# Patient Record
Sex: MALE | Race: WHITE | NOT HISPANIC OR LATINO | Employment: OTHER | URBAN - METROPOLITAN AREA
[De-identification: names, ages, dates, MRNs, and addresses within clinical notes are randomized per-mention and may not be internally consistent; named-entity substitution may affect disease eponyms.]

---

## 2017-08-29 DIAGNOSIS — F41.9 ANXIETY DISORDER, UNSPECIFIED TYPE: ICD-10-CM

## 2017-08-29 DIAGNOSIS — R73.09 ELEVATED HEMOGLOBIN A1C: ICD-10-CM

## 2017-08-29 DIAGNOSIS — Z00.00 ANNUAL PHYSICAL EXAM: ICD-10-CM

## 2017-08-29 DIAGNOSIS — E78.5 HYPERLIPIDEMIA, UNSPECIFIED HYPERLIPIDEMIA TYPE: Primary | ICD-10-CM

## 2017-08-29 LAB
CHOLEST SERPL-MCNC: 149 MG/DL (ref 0–200)
HBA1C MFR BLD: 5.79 % (ref 4.8–5.6)
HDLC SERPL-MCNC: 41 MG/DL (ref 40–60)
LDLC SERPL CALC-MCNC: 83 MG/DL (ref 0–100)
TRIGL SERPL-MCNC: 126 MG/DL (ref 0–150)
VLDLC SERPL CALC-MCNC: 25.2 MG/DL (ref 5–40)

## 2017-08-30 LAB — MICROALBUMIN UR-MCNC: 4.9 UG/ML

## 2017-09-05 ENCOUNTER — OFFICE VISIT (OUTPATIENT)
Dept: SPORTS MEDICINE | Facility: CLINIC | Age: 72
End: 2017-09-05

## 2017-09-05 VITALS
SYSTOLIC BLOOD PRESSURE: 116 MMHG | WEIGHT: 174 LBS | BODY MASS INDEX: 26.37 KG/M2 | HEIGHT: 68 IN | OXYGEN SATURATION: 98 % | DIASTOLIC BLOOD PRESSURE: 64 MMHG | HEART RATE: 64 BPM

## 2017-09-05 DIAGNOSIS — E78.2 MIXED HYPERLIPIDEMIA: ICD-10-CM

## 2017-09-05 DIAGNOSIS — Z12.5 SCREENING FOR PROSTATE CANCER: ICD-10-CM

## 2017-09-05 DIAGNOSIS — R73.03 PREDIABETES: Primary | ICD-10-CM

## 2017-09-05 DIAGNOSIS — R39.12 POOR URINARY STREAM: ICD-10-CM

## 2017-09-05 DIAGNOSIS — M54.41 CHRONIC RIGHT-SIDED LOW BACK PAIN WITH RIGHT-SIDED SCIATICA: ICD-10-CM

## 2017-09-05 DIAGNOSIS — G89.29 CHRONIC RIGHT-SIDED LOW BACK PAIN WITH RIGHT-SIDED SCIATICA: ICD-10-CM

## 2017-09-05 DIAGNOSIS — Z00.00 MEDICARE ANNUAL WELLNESS VISIT, SUBSEQUENT: ICD-10-CM

## 2017-09-05 PROCEDURE — 72100 X-RAY EXAM L-S SPINE 2/3 VWS: CPT | Performed by: FAMILY MEDICINE

## 2017-09-05 PROCEDURE — G0439 PPPS, SUBSEQ VISIT: HCPCS | Performed by: FAMILY MEDICINE

## 2017-09-05 PROCEDURE — 99214 OFFICE O/P EST MOD 30 MIN: CPT | Performed by: FAMILY MEDICINE

## 2017-09-05 RX ORDER — SIMVASTATIN 10 MG
10 TABLET ORAL DAILY
Qty: 90 TABLET | Refills: 3 | Status: SHIPPED | OUTPATIENT
Start: 2017-09-05 | End: 2018-08-29 | Stop reason: SDUPTHER

## 2017-09-05 NOTE — PROGRESS NOTES
QUICK REFERENCE INFORMATION:  The ABCs of the Annual Wellness Visit    Subsequent Medicare Wellness Visit    HEALTH RISK ASSESSMENT    1945    Recent Hospitalizations:  No hospitalization(s) within the last year..        Current Medical Providers:  Patient Care Team:  David Franks MD as PCP - General (Sports Medicine)  Rocío Carrillo MD as PCP - Claims Attributed        Smoking Status:  History   Smoking Status   • Never Smoker   Smokeless Tobacco   • Not on file       Alcohol Consumption:  History   Alcohol Use   • Yes       Depression Screen:   PHQ-2/PHQ-9 Depression Screening 9/5/2017   Little interest or pleasure in doing things 0   Feeling down, depressed, or hopeless 0   Trouble falling or staying asleep, or sleeping too much 0   Feeling tired or having little energy 1   Poor appetite or overeating 0   Feeling bad about yourself - or that you are a failure or have let yourself or your family down 0   Trouble concentrating on things, such as reading the newspaper or watching television 0   Moving or speaking so slowly that other people could have noticed. Or the opposite - being so fidgety or restless that you have been moving around a lot more than usual 0   Total Score 1   If you checked off any problems, how difficult have these problems made it for you to do your work, take care of things at home, or get along with other people? Not difficult at all       Health Habits and Functional and Cognitive Screening:  No flowsheet data found.           Does the patient have evidence of cognitive impairment? No    Aspirin use counseling: Does not need ASA (and currently is not on it)      Recent Lab Results:  CMP:  Lab Results   Component Value Date    GLU 94 09/05/2017    BUN 11 09/05/2017    CREATININE 1.05 09/05/2017    EGFRIFNONA 70 09/05/2017    EGFRIFAFRI 84 09/05/2017    BCR 10.5 09/05/2017     09/05/2017    K 4.6 09/05/2017    CO2 31.6 (H) 09/05/2017    CALCIUM 10.2 09/05/2017     PROTENTOTREF 8.0 09/05/2017    ALBUMIN 4.90 09/05/2017    LABGLOBREF 3.1 09/05/2017    LABIL2 1.6 09/05/2017    BILITOT 1.3 (H) 09/05/2017    ALKPHOS 74 09/05/2017    AST 23 09/05/2017    ALT 23 09/05/2017     Lipid Panel:  Lab Results   Component Value Date    TRIG 126 08/29/2017    HDL 41 08/29/2017    VLDL 25.2 08/29/2017     HbA1c:  Lab Results   Component Value Date    HGBA1C 5.79 (H) 08/29/2017       Visual Acuity:  No exam data present    Age-appropriate Screening Schedule:  Refer to the list below for future screening recommendations based on patient's age, sex and/or medical conditions. Orders for these recommended tests are listed in the plan section. The patient has been provided with a written plan.    Health Maintenance   Topic Date Due   • INFLUENZA VACCINE  09/01/2018 (Originally 8/1/2017)   • LIPID PANEL  08/29/2018   • COLONOSCOPY  10/03/2018   • TDAP/TD VACCINES (2 - Td) 01/01/2020   • PNEUMOCOCCAL VACCINES (65+ LOW/MEDIUM RISK)  Completed   • ZOSTER VACCINE  Addressed        Subjective   History of Present Illness    Chepe Arias is a 72 y.o. male who presents for an Subsequent Wellness Visit.    The following portions of the patient's history were reviewed and updated as appropriate: allergies, current medications, past family history, past medical history, past social history, past surgical history and problem list.    Outpatient Medications Prior to Visit   Medication Sig Dispense Refill   • ALPRAZolam (XANAX) 0.5 MG tablet Take  by mouth.     • Cholecalciferol (VITAMIN D) 2000 UNITS capsule Take  by mouth.     • famotidine (PEPCID) 20 MG tablet Take  by mouth.     • FLUoxetine (PROzac) 20 MG capsule      • Magnesium 400 MG capsule Take  by mouth.     • Multiple Minerals-Vitamins (CALCIUM CITRATE +) tablet Take  by mouth.     • MULTIPLE VITAMIN PO Take  by mouth.     • Omega-3 Fatty Acids (FISH OIL) 1000 MG capsule capsule Take  by mouth.     • FLUoxetine (PROZAC) 20 MG capsule Take  by  "mouth daily.     • simvastatin (ZOCOR) 10 MG tablet Take 1 tablet by mouth daily. 90 tablet 3     No facility-administered medications prior to visit.        Patient Active Problem List   Diagnosis   • Hyperlipidemia   • Low back pain   • Anxiety disorder   • Annual physical exam       Advance Care Planning:  power of  for healthcare on file    Identification of Risk Factors:  Risk factors include: cardiovascular risk and hearing limitations.    Review of Systems    Compared to one year ago, the patient feels his physical health is the same.  Compared to one year ago, the patient feels his mental health is the same.    Objective     Physical Exam    Vitals:    09/05/17 1142   BP: 116/64   Pulse: 64   SpO2: 98%   Weight: 174 lb (78.9 kg)   Height: 68\" (172.7 cm)       Body mass index is 26.46 kg/(m^2).  Discussed the patient's BMI with him. The BMI is above average; BMI management plan is completed.    Assessment/Plan   Patient Self-Management and Personalized Health Advice  The patient has been provided with information about: diet, exercise and weight management and preventive services including:   · Counseling for cardiovascular disease risk reduction, Exercise counseling provided, Fall Risk assessment done, Prostate cancer screening discussed.    Visit Diagnoses:    ICD-10-CM ICD-9-CM   1. Prediabetes R73.03 790.29   2. Mixed hyperlipidemia E78.2 272.2   3. Chronic right-sided low back pain with right-sided sciatica M54.41 724.2    G89.29 724.3     338.29   4. Medicare annual wellness visit, subsequent Z00.00 V70.0   5. Screening for prostate cancer Z12.5 V76.44   6. Poor urinary stream  R39.12 788.62       Orders Placed This Encounter   Procedures   • XR Spine Lumbar 2 or 3 View     Order Specific Question:   Reason for Exam:     Answer:   pain   • Comprehensive Metabolic Panel     Order Specific Question:   [facility] Has the patient fasted?     Answer:   No   • Hepatitis C Antibody     Order Specific " Question:   [facility] Has the patient fasted?     Answer:   No   • PSA     Order Specific Question:   [facility] Has the patient fasted?     Answer:   No   • Thyroid Cascade Profile     Order Specific Question:   [facility] Has the patient fasted?     Answer:   No   • CBC & Differential     Order Specific Question:   Manual Differential     Answer:   No     Order Specific Question:   [facility] Has the patient fasted?     Answer:   No       Outpatient Encounter Prescriptions as of 9/5/2017   Medication Sig Dispense Refill   • ALPRAZolam (XANAX) 0.5 MG tablet Take  by mouth.     • Cholecalciferol (VITAMIN D) 2000 UNITS capsule Take  by mouth.     • famotidine (PEPCID) 20 MG tablet Take  by mouth.     • FLUoxetine (PROzac) 20 MG capsule      • Magnesium 400 MG capsule Take  by mouth.     • Multiple Minerals-Vitamins (CALCIUM CITRATE +) tablet Take  by mouth.     • MULTIPLE VITAMIN PO Take  by mouth.     • Omega-3 Fatty Acids (FISH OIL) 1000 MG capsule capsule Take  by mouth.     • simvastatin (ZOCOR) 10 MG tablet Take 1 tablet by mouth Daily. 90 tablet 3   • [DISCONTINUED] FLUoxetine (PROZAC) 20 MG capsule Take  by mouth daily.     • [DISCONTINUED] simvastatin (ZOCOR) 10 MG tablet Take 1 tablet by mouth daily. 90 tablet 3     No facility-administered encounter medications on file as of 9/5/2017.        Reviewed use of high risk medication in the elderly: yes  Reviewed for potential of harmful drug interactions in the elderly: yes    Follow Up:  Return in about 6 months (around 3/5/2018) for Recheck.     An After Visit Summary and PPPS with all of these plans were given to the patient.

## 2017-09-05 NOTE — PROGRESS NOTES
Chepe is a 72 y.o. year old male    Chief Complaint   Patient presents with   • Diabetes   • Back Pain     Pt would like to discuss   *In addition to AWV    History of Present Illness   (pre-diabetes)  Diabetes   He presents for his follow-up diabetic visit. Diabetes type: pre-diabetes. His disease course has been stable. Pertinent negatives for hypoglycemia include no headaches. Associated symptoms include weakness (generalized, both legs). Pertinent negatives for diabetes include no chest pain and no weight loss.   Back Pain   This is a recurrent problem. The current episode started more than 1 year ago. The problem occurs intermittently. The problem has been gradually worsening since onset. The pain is present in the sacro-iliac. The quality of the pain is described as shooting. The pain radiates to the right thigh. The pain is at a severity of 7/10. The pain is worse during the day. The symptoms are aggravated by bending, position, standing and twisting. Stiffness is present all day. Associated symptoms include leg pain, numbness, paresthesias, pelvic pain, tingling and weakness (generalized, both legs). Pertinent negatives include no abdominal pain, bladder incontinence, bowel incontinence, chest pain, dysuria, fever, headaches, paresis, perianal numbness or weight loss. Risk factors include history of osteoporosis and obesity.       HLD stable with meds, no sx or SE    I have reviewed the patient's medical, family, and social history in detail and updated the computerized patient record.    Review of Systems   Constitutional: Negative for fever and weight loss.   Cardiovascular: Negative for chest pain.   Gastrointestinal: Negative for abdominal pain and bowel incontinence.   Genitourinary: Positive for pelvic pain. Negative for bladder incontinence and dysuria.   Musculoskeletal: Positive for back pain.   Neurological: Positive for tingling, weakness (generalized, both legs), numbness and paresthesias.  "Negative for headaches.       /64  Pulse 64  Ht 68\" (172.7 cm)  Wt 174 lb (78.9 kg)  SpO2 98%  BMI 26.46 kg/m2     Physical Exam   Constitutional: He is oriented to person, place, and time. He appears well-developed and well-nourished.   HENT:   Head: Normocephalic and atraumatic.   Eyes: Conjunctivae are normal. Pupils are equal, round, and reactive to light.   Neck: Normal range of motion.   Cardiovascular: Normal rate, regular rhythm and normal heart sounds.    Pulmonary/Chest: Effort normal and breath sounds normal.   Musculoskeletal:   L-spine - normal appearance. TTP mildlnie l2-l4. Restricted ROM. Neg slr   Neurological: He is alert and oriented to person, place, and time.   Skin: Skin is warm and dry.   Psychiatric: He has a normal mood and affect. Judgment normal.   Nursing note and vitals reviewed.    Lumbar Spine X-Ray  Indication: Pain  Views: AP and Lateral    Findings:  No fracture  No bony lesion  Normal soft tissues  DJD multiple levels, s/p fusion and kyphplasty    No prior studies were available for comparison.     Diagnoses and all orders for this visit:    Prediabetes  -     CBC & Differential  -     Comprehensive Metabolic Panel  -     Hepatitis C Antibody  -     PSA  -     Thyroid Cascade Profile    Mixed hyperlipidemia  -     CBC & Differential  -     Comprehensive Metabolic Panel  -     Hepatitis C Antibody  -     PSA  -     Thyroid Cascade Profile    Chronic right-sided low back pain with right-sided sciatica  -     XR Spine Lumbar 2 or 3 View    Medicare annual wellness visit, subsequent  -     CBC & Differential  -     Comprehensive Metabolic Panel  -     Hepatitis C Antibody  -     PSA  -     Thyroid Cascade Profile    Screening for prostate cancer  -     CBC & Differential  -     Comprehensive Metabolic Panel  -     Hepatitis C Antibody  -     PSA  -     Thyroid Cascade Profile    Poor urinary stream   -     PSA    Other orders  -     simvastatin (ZOCOR) 10 MG tablet; Take 1 " tablet by mouth Daily.    Continue current meds/tx. Keep optimizing diet/exercise for pre-dm.   Hep for chronic lbp

## 2017-09-06 LAB
ALBUMIN SERPL-MCNC: 4.9 G/DL (ref 3.5–5.2)
ALBUMIN/GLOB SERPL: 1.6 G/DL
ALP SERPL-CCNC: 74 U/L (ref 39–117)
ALT SERPL-CCNC: 23 U/L (ref 1–41)
AST SERPL-CCNC: 23 U/L (ref 1–40)
BASOPHILS # BLD AUTO: 0.08 10*3/MM3 (ref 0–0.2)
BASOPHILS NFR BLD AUTO: 1 % (ref 0–1.5)
BILIRUB SERPL-MCNC: 1.3 MG/DL (ref 0.1–1.2)
BUN SERPL-MCNC: 11 MG/DL (ref 8–23)
BUN/CREAT SERPL: 10.5 (ref 7–25)
CALCIUM SERPL-MCNC: 10.2 MG/DL (ref 8.6–10.5)
CHLORIDE SERPL-SCNC: 98 MMOL/L (ref 98–107)
CO2 SERPL-SCNC: 31.6 MMOL/L (ref 22–29)
CREAT SERPL-MCNC: 1.05 MG/DL (ref 0.76–1.27)
EOSINOPHIL # BLD AUTO: 0.32 10*3/MM3 (ref 0–0.7)
EOSINOPHIL NFR BLD AUTO: 4 % (ref 0.3–6.2)
ERYTHROCYTE [DISTWIDTH] IN BLOOD BY AUTOMATED COUNT: 12.3 % (ref 11.5–14.5)
GLOBULIN SER CALC-MCNC: 3.1 GM/DL
GLUCOSE SERPL-MCNC: 94 MG/DL (ref 65–99)
HCT VFR BLD AUTO: 47 % (ref 40.4–52.2)
HCV AB S/CO SERPL IA: <0.1 S/CO RATIO (ref 0–0.9)
HGB BLD-MCNC: 15 G/DL (ref 13.7–17.6)
IMM GRANULOCYTES # BLD: 0 10*3/MM3 (ref 0–0.03)
IMM GRANULOCYTES NFR BLD: 0 % (ref 0–0.5)
LYMPHOCYTES # BLD AUTO: 1.78 10*3/MM3 (ref 0.9–4.8)
LYMPHOCYTES NFR BLD AUTO: 22 % (ref 19.6–45.3)
MCH RBC QN AUTO: 31.3 PG (ref 27–32.7)
MCHC RBC AUTO-ENTMCNC: 31.9 G/DL (ref 32.6–36.4)
MCV RBC AUTO: 98.1 FL (ref 79.8–96.2)
MONOCYTES # BLD AUTO: 0.49 10*3/MM3 (ref 0.2–1.2)
MONOCYTES NFR BLD AUTO: 6 % (ref 5–12)
NEUTROPHILS # BLD AUTO: 5.43 10*3/MM3 (ref 1.9–8.1)
NEUTROPHILS NFR BLD AUTO: 67 % (ref 42.7–76)
PLATELET # BLD AUTO: 242 10*3/MM3 (ref 140–500)
POTASSIUM SERPL-SCNC: 4.6 MMOL/L (ref 3.5–5.2)
PROT SERPL-MCNC: 8 G/DL (ref 6–8.5)
PSA SERPL-MCNC: 1.17 NG/ML (ref 0–4)
RBC # BLD AUTO: 4.79 10*6/MM3 (ref 4.6–6)
SODIUM SERPL-SCNC: 142 MMOL/L (ref 136–145)
TSH SERPL DL<=0.005 MIU/L-ACNC: 1.22 UIU/ML (ref 0.45–4.5)
WBC # BLD AUTO: 8.1 10*3/MM3 (ref 4.5–10.7)

## 2017-09-08 ENCOUNTER — TELEPHONE (OUTPATIENT)
Dept: SPORTS MEDICINE | Facility: CLINIC | Age: 72
End: 2017-09-08

## 2017-09-08 NOTE — TELEPHONE ENCOUNTER
----- Message from David Franks MD sent at 9/8/2017 12:57 AM EDT -----  Your other labs are all ok.

## 2018-08-29 RX ORDER — SIMVASTATIN 10 MG
TABLET ORAL
Qty: 30 TABLET | Refills: 0 | Status: SHIPPED | OUTPATIENT
Start: 2018-08-29 | End: 2018-09-12 | Stop reason: SDUPTHER

## 2018-09-04 DIAGNOSIS — E78.2 MIXED HYPERLIPIDEMIA: ICD-10-CM

## 2018-09-04 DIAGNOSIS — Z00.00 ANNUAL PHYSICAL EXAM: Primary | ICD-10-CM

## 2018-09-04 DIAGNOSIS — Z12.5 SCREENING FOR PROSTATE CANCER: ICD-10-CM

## 2018-09-04 DIAGNOSIS — R39.12 POOR URINARY STREAM: ICD-10-CM

## 2018-09-04 DIAGNOSIS — R73.03 PREDIABETES: ICD-10-CM

## 2018-09-06 LAB
ALBUMIN SERPL-MCNC: 4.7 G/DL (ref 3.5–5.2)
ALBUMIN/GLOB SERPL: 2 G/DL
ALP SERPL-CCNC: 61 U/L (ref 39–117)
ALT SERPL-CCNC: 21 U/L (ref 1–41)
AST SERPL-CCNC: 25 U/L (ref 1–40)
BASOPHILS # BLD AUTO: 0.08 10*3/MM3 (ref 0–0.2)
BASOPHILS NFR BLD AUTO: 1.2 % (ref 0–1.5)
BILIRUB SERPL-MCNC: 1.5 MG/DL (ref 0.1–1.2)
BUN SERPL-MCNC: 12 MG/DL (ref 8–23)
BUN/CREAT SERPL: 11.1 (ref 7–25)
CALCIUM SERPL-MCNC: 9.4 MG/DL (ref 8.6–10.5)
CHLORIDE SERPL-SCNC: 101 MMOL/L (ref 98–107)
CHOLEST SERPL-MCNC: 164 MG/DL (ref 0–200)
CO2 SERPL-SCNC: 28.8 MMOL/L (ref 22–29)
CREAT SERPL-MCNC: 1.08 MG/DL (ref 0.76–1.27)
DIFFERENTIAL COMMENT: NORMAL
EOSINOPHIL # BLD AUTO: 0.46 10*3/MM3 (ref 0–0.7)
EOSINOPHIL NFR BLD AUTO: 6.9 % (ref 0.3–6.2)
ERYTHROCYTE [DISTWIDTH] IN BLOOD BY AUTOMATED COUNT: 12.4 % (ref 11.5–14.5)
GLOBULIN SER CALC-MCNC: 2.4 GM/DL
GLUCOSE SERPL-MCNC: 105 MG/DL (ref 65–99)
HBA1C MFR BLD: 5.7 % (ref 4.8–5.6)
HCT VFR BLD AUTO: 46 % (ref 40.4–52.2)
HDLC SERPL-MCNC: 45 MG/DL (ref 40–60)
HGB BLD-MCNC: 14.7 G/DL (ref 13.7–17.6)
IMM GRANULOCYTES # BLD: 0 10*3/MM3 (ref 0–0.03)
IMM GRANULOCYTES NFR BLD: 0 % (ref 0–0.5)
LDLC SERPL CALC-MCNC: 93 MG/DL (ref 0–100)
LYMPHOCYTES # BLD AUTO: 1.85 10*3/MM3 (ref 0.9–4.8)
LYMPHOCYTES NFR BLD AUTO: 27.7 % (ref 19.6–45.3)
Lab: NORMAL
MCH RBC QN AUTO: 31 PG (ref 27–32.7)
MCHC RBC AUTO-ENTMCNC: 32 G/DL (ref 32.6–36.4)
MCV RBC AUTO: 97 FL (ref 79.8–96.2)
MONOCYTES # BLD AUTO: 0.31 10*3/MM3 (ref 0.2–1.2)
MONOCYTES NFR BLD AUTO: 4.6 % (ref 5–12)
NEUTROPHILS # BLD AUTO: 3.97 10*3/MM3 (ref 1.9–8.1)
NEUTROPHILS NFR BLD AUTO: 59.6 % (ref 42.7–76)
PLATELET # BLD AUTO: 191 10*3/MM3 (ref 140–500)
PLATELET BLD QL SMEAR: NORMAL
POTASSIUM SERPL-SCNC: 4.5 MMOL/L (ref 3.5–5.2)
PROT SERPL-MCNC: 7.1 G/DL (ref 6–8.5)
PSA SERPL-MCNC: 1.42 NG/ML (ref 0–4)
RBC # BLD AUTO: 4.74 10*6/MM3 (ref 4.6–6)
RBC MORPH BLD: NORMAL
SODIUM SERPL-SCNC: 141 MMOL/L (ref 136–145)
TRIGL SERPL-MCNC: 130 MG/DL (ref 0–150)
TSH SERPL DL<=0.005 MIU/L-ACNC: 2.05 UIU/ML (ref 0.45–4.5)
VLDLC SERPL CALC-MCNC: 26 MG/DL (ref 5–40)
WBC # BLD AUTO: 6.67 10*3/MM3 (ref 4.5–10.7)
WRITTEN AUTHORIZATION: NORMAL

## 2018-09-12 ENCOUNTER — OFFICE VISIT (OUTPATIENT)
Dept: SPORTS MEDICINE | Facility: CLINIC | Age: 73
End: 2018-09-12

## 2018-09-12 VITALS
HEART RATE: 54 BPM | HEIGHT: 68 IN | WEIGHT: 177 LBS | SYSTOLIC BLOOD PRESSURE: 118 MMHG | OXYGEN SATURATION: 96 % | BODY MASS INDEX: 26.83 KG/M2 | DIASTOLIC BLOOD PRESSURE: 70 MMHG | TEMPERATURE: 98.1 F

## 2018-09-12 DIAGNOSIS — Z00.00 MEDICARE ANNUAL WELLNESS VISIT, SUBSEQUENT: Primary | ICD-10-CM

## 2018-09-12 DIAGNOSIS — E78.2 MIXED HYPERLIPIDEMIA: ICD-10-CM

## 2018-09-12 DIAGNOSIS — M54.2 NECK PAIN: ICD-10-CM

## 2018-09-12 DIAGNOSIS — F41.9 ANXIETY DISORDER, UNSPECIFIED TYPE: ICD-10-CM

## 2018-09-12 DIAGNOSIS — M51.36 LUMBAR DEGENERATIVE DISC DISEASE: ICD-10-CM

## 2018-09-12 DIAGNOSIS — R73.03 PREDIABETES: ICD-10-CM

## 2018-09-12 PROCEDURE — 90471 IMMUNIZATION ADMIN: CPT | Performed by: FAMILY MEDICINE

## 2018-09-12 PROCEDURE — G0439 PPPS, SUBSEQ VISIT: HCPCS | Performed by: FAMILY MEDICINE

## 2018-09-12 PROCEDURE — 99214 OFFICE O/P EST MOD 30 MIN: CPT | Performed by: FAMILY MEDICINE

## 2018-09-12 PROCEDURE — 90632 HEPA VACCINE ADULT IM: CPT | Performed by: FAMILY MEDICINE

## 2018-09-12 RX ORDER — SIMVASTATIN 10 MG
10 TABLET ORAL DAILY
Qty: 90 TABLET | Refills: 3 | Status: SHIPPED | OUTPATIENT
Start: 2018-09-12 | End: 2018-10-17 | Stop reason: SDUPTHER

## 2018-09-12 NOTE — PROGRESS NOTES
QUICK REFERENCE INFORMATION:  The ABCs of the Annual Wellness Visit    Subsequent Medicare Wellness Visit    HEALTH RISK ASSESSMENT    1945    Recent Hospitalizations:  No hospitalization(s) within the last year..        Current Medical Providers:  Patient Care Team:  David Franks MD as PCP - General (Sports Medicine)  Rocío Carrillo MD as PCP - Claims Attributed        Smoking Status:  History   Smoking Status   • Never Smoker   Smokeless Tobacco   • Not on file       Alcohol Consumption:  History   Alcohol Use   • Yes       Depression Screen:   PHQ-2/PHQ-9 Depression Screening 9/12/2018   Little interest or pleasure in doing things 0   Feeling down, depressed, or hopeless 1   Trouble falling or staying asleep, or sleeping too much -   Feeling tired or having little energy -   Poor appetite or overeating -   Feeling bad about yourself - or that you are a failure or have let yourself or your family down -   Trouble concentrating on things, such as reading the newspaper or watching television -   Moving or speaking so slowly that other people could have noticed. Or the opposite - being so fidgety or restless that you have been moving around a lot more than usual -   Total Score 1   If you checked off any problems, how difficult have these problems made it for you to do your work, take care of things at home, or get along with other people? -       Health Habits and Functional and Cognitive Screening:  Functional & Cognitive Status 9/12/2018   Do you have difficulty preparing food and eating? No   Do you have difficulty bathing yourself, getting dressed or grooming yourself? No   Do you have difficulty using the toilet? No   Do you have difficulty moving around from place to place? No   Do you have trouble with steps or getting out of a bed or a chair? No   In the past year have you fallen or experienced a near fall? No   Current Diet Frequent Junk Food   Dental Exam Up to date   Eye Exam Not up to  date   Exercise (times per week) 3 times per week   Current Exercise Activities Include Stationary Bicycling/Spin Class   Do you need help using the phone?  Yes   Are you deaf or do you have serious difficulty hearing?  Yes   Do you need help with transportation? No   Do you need help shopping? No   Do you need help preparing meals?  No   Do you need help with housework?  No   Do you need help with laundry? No   Do you need help taking your medications? No   Do you need help managing money? No   Do you ever drive or ride in a car without wearing a seat belt? No           Does the patient have evidence of cognitive impairment? No    Aspirin use counseling: Start ASA 81 mg daily       Recent Lab Results:  CMP:  Lab Results   Component Value Date     (H) 09/05/2018    BUN 12 09/05/2018    CREATININE 1.08 09/05/2018    EGFRIFNONA 67 09/05/2018    EGFRIFAFRI 81 09/05/2018    BCR 11.1 09/05/2018     09/05/2018    K 4.5 09/05/2018    CO2 28.8 09/05/2018    CALCIUM 9.4 09/05/2018    PROTENTOTREF 7.1 09/05/2018    ALBUMIN 4.70 09/05/2018    LABGLOBREF 2.4 09/05/2018    LABIL2 2.0 09/05/2018    BILITOT 1.5 (H) 09/05/2018    ALKPHOS 61 09/05/2018    AST 25 09/05/2018    ALT 21 09/05/2018     Lipid Panel:  Lab Results   Component Value Date    TRIG 130 09/05/2018    HDL 45 09/05/2018    VLDL 26 09/05/2018     HbA1c:  Lab Results   Component Value Date    HGBA1C 5.70 (H) 09/05/2018       Visual Acuity:  No exam data present    Age-appropriate Screening Schedule:  Refer to the list below for future screening recommendations based on patient's age, sex and/or medical conditions. Orders for these recommended tests are listed in the plan section. The patient has been provided with a written plan.    Health Maintenance   Topic Date Due   • ZOSTER VACCINE (2 of 2) 09/08/2016   • INFLUENZA VACCINE  08/01/2018   • COLONOSCOPY  10/03/2018   • LIPID PANEL  09/05/2019   • TDAP/TD VACCINES (2 - Td) 01/01/2020   • PNEUMOCOCCAL  "VACCINES (65+ LOW/MEDIUM RISK)  Completed        Subjective   History of Present Illness    Chepe Arias is a 73 y.o. male who presents for an Subsequent Wellness Visit.    The following portions of the patient's history were reviewed and updated as appropriate: allergies, current medications, past family history, past medical history, past social history, past surgical history and problem list.    Outpatient Medications Prior to Visit   Medication Sig Dispense Refill   • ALPRAZolam (XANAX) 0.5 MG tablet Take  by mouth.     • Cholecalciferol (VITAMIN D) 2000 UNITS capsule Take  by mouth.     • famotidine (PEPCID) 20 MG tablet Take  by mouth.     • FLUoxetine (PROzac) 20 MG capsule      • Magnesium 400 MG capsule Take  by mouth.     • Multiple Minerals-Vitamins (CALCIUM CITRATE +) tablet Take  by mouth.     • MULTIPLE VITAMIN PO Take  by mouth.     • Omega-3 Fatty Acids (FISH OIL) 1000 MG capsule capsule Take  by mouth.     • simvastatin (ZOCOR) 10 MG tablet TAKE 1 TABLET DAILY 30 tablet 0     No facility-administered medications prior to visit.        Patient Active Problem List   Diagnosis   • Hyperlipidemia   • Low back pain   • Prediabetes   • Anxiety disorder   • Annual physical exam       Advance Care Planning:  power of  for healthcare on file    Identification of Risk Factors:  Risk factors include: cardiovascular risk, depression, vision limitations and hearing limitations.    Review of Systems    Compared to one year ago, the patient feels his physical health is the same.  Compared to one year ago, the patient feels his mental health is better.    Objective     Physical Exam    Vitals:    09/12/18 0849   BP: 118/70   Pulse: 54   Temp: 98.1 °F (36.7 °C)   SpO2: 96%   Weight: 80.3 kg (177 lb)   Height: 172.7 cm (68\")       Patient's Body mass index is 26.91 kg/m². BMI is above normal parameters. Recommendations include: exercise counseling and nutrition counseling.      Assessment/Plan   Patient " Self-Management and Personalized Health Advice  The patient has been provided with information about: diet, exercise and prevention of cardiac or vascular disease and preventive services including:   · Counseling for cardiovascular disease risk reduction, Diabetes screening, see lab orders, Exercise counseling provided, Influenza vaccine, Prostate cancer screening discussed.    Visit Diagnoses:    ICD-10-CM ICD-9-CM   1. Medicare annual wellness visit, subsequent Z00.00 V70.0   2. Mixed hyperlipidemia E78.2 272.2   3. Neck pain M54.2 723.1   4. Lumbar degenerative disc disease M51.36 722.52   5. Prediabetes R73.03 790.29   6. Anxiety disorder, unspecified type F41.9 300.00       Orders Placed This Encounter   Procedures   • Hepatitis A Vaccine Adult (HAVRIX) - Today   • Hepatitis A Vaccine Adult  (HAVRIX) - Dose 2     Standing Status:   Future   • Ambulatory Referral to Physical Therapy Evaluate and treat     Referral Priority:   Routine     Referral Type:   Therapy     Referral Reason:   Specialty Services Required     Requested Specialty:   Physical Therapy     Number of Visits Requested:   1       Outpatient Encounter Prescriptions as of 9/12/2018   Medication Sig Dispense Refill   • ALPRAZolam (XANAX) 0.5 MG tablet Take  by mouth.     • Cholecalciferol (VITAMIN D) 2000 UNITS capsule Take  by mouth.     • famotidine (PEPCID) 20 MG tablet Take  by mouth.     • FLUoxetine (PROzac) 20 MG capsule      • Magnesium 400 MG capsule Take  by mouth.     • Multiple Minerals-Vitamins (CALCIUM CITRATE +) tablet Take  by mouth.     • MULTIPLE VITAMIN PO Take  by mouth.     • Omega-3 Fatty Acids (FISH OIL) 1000 MG capsule capsule Take  by mouth.     • simvastatin (ZOCOR) 10 MG tablet Take 1 tablet by mouth Daily. 90 tablet 3   • [DISCONTINUED] simvastatin (ZOCOR) 10 MG tablet TAKE 1 TABLET DAILY 30 tablet 0     No facility-administered encounter medications on file as of 9/12/2018.        Reviewed use of high risk medication in  the elderly: yes  Reviewed for potential of harmful drug interactions in the elderly: yes    Follow Up:  Return in about 6 months (around 3/12/2019), or Hep A #2.     An After Visit Summary and PPPS with all of these plans were given to the patient.

## 2018-09-12 NOTE — PROGRESS NOTES
"Chepe is a 73 y.o. year old male    Chief Complaint   Patient presents with   • Annual Exam   In addition to annual wellness visit here for complaints of neck and back pain, also f/u HLD and pre-dm.     History of Present Illness   Back Pain   This is a recurrent problem. The current episode started more than 1 year ago. The problem occurs every several days. The problem has been waxing and waning since onset. The pain is present in the lumbar spine. The quality of the pain is described as aching. The pain radiates to the right thigh. The pain is at a severity of 2/10. The pain is worse during the day. The symptoms are aggravated by bending. Stiffness is present in the morning. Associated symptoms include numbness (occ RLE). Pertinent negatives include no headaches.      New complaint of neck pain. Worse with movement, mild, constant, 4 months. NKI, gradual onset.   Chronic back pain \"about the same\". Stiff, occ rad to R leg.     Wants hep A vac    Recheck HLD, stable with statin.  Prediabetes doing well, continues caution with sugar intake.     I have reviewed the patient's medical, family, and social history in detail and updated the computerized patient record.    Review of Systems   Constitutional: Negative.    HENT: Positive for hearing loss (stable with cochlear implant on L and hearing aid on R).    Eyes: Positive for visual disturbance (worsening vision this year, having it checked).   Respiratory: Negative.    Cardiovascular: Negative.    Gastrointestinal: Negative.    Genitourinary: Negative.    Musculoskeletal: Positive for back pain.   Neurological: Positive for numbness (occ RLE). Negative for headaches.   Psychiatric/Behavioral: The patient is nervous/anxious (stable with psych management).        /70   Pulse 54   Temp 98.1 °F (36.7 °C)   Ht 172.7 cm (68\")   Wt 80.3 kg (177 lb)   SpO2 96%   BMI 26.91 kg/m²      Physical Exam   Constitutional: He is oriented to person, place, and time. He " appears well-developed and well-nourished.   HENT:   Head: Normocephalic and atraumatic.   Mouth/Throat: Oropharynx is clear and moist.   R ear hearing aid; L side cochlear implant   Eyes: Pupils are equal, round, and reactive to light. Conjunctivae are normal.   Neck: Normal range of motion.   Cardiovascular: Normal rate, regular rhythm and normal heart sounds.    No LE edema   Pulmonary/Chest: Effort normal and breath sounds normal.   Musculoskeletal:   Cervical spine: Normal appearance.  Mild right-sided paraspinal tenderness to palpation.  Range of motion intact, but there is some subjective tightness with right-sided rotation and side bending.  Negative axial load   Neurological: He is alert and oriented to person, place, and time.   Skin: Skin is warm and dry.   Psychiatric: He has a normal mood and affect. Judgment normal.   Nursing note and vitals reviewed.       Diagnoses and all orders for this visit:    Medicare annual wellness visit, subsequent  -     Hepatitis A Vaccine Adult (HAVRIX) - Today  -     Hepatitis A Vaccine Adult  (HAVRIX) - Dose 2; Future    Mixed hyperlipidemia  -     simvastatin (ZOCOR) 10 MG tablet; Take 1 tablet by mouth Daily.    Neck pain  -     Ambulatory Referral to Physical Therapy Evaluate and treat    Lumbar degenerative disc disease  -     Ambulatory Referral to Physical Therapy Evaluate and treat    Prediabetes  -     simvastatin (ZOCOR) 10 MG tablet; Take 1 tablet by mouth Daily.    Anxiety disorder, unspecified type       Start in physical therapy for his neck and his back.  Continue Tylenol and heat for pain relief.  Continue follow-up with psychiatry for anxiety.  Continue simvastatin for hyperlipidemia.  Continue his diet and exercise for prediabetes, doing well with that.      EMR Dragon/Transcription disclaimer:    Much of this encounter note is an electronic transcription/translation of spoken language to printed text.  The electronic translation of spoken language may  permit erroneous, or at times, nonsensical words or phrases to be inadvertently transcribed.  Although I have reviewed the note for such errors some may still exist.

## 2018-09-21 ENCOUNTER — TREATMENT (OUTPATIENT)
Dept: PHYSICAL THERAPY | Facility: CLINIC | Age: 73
End: 2018-09-21

## 2018-09-21 DIAGNOSIS — M54.2 NECK PAIN: Primary | ICD-10-CM

## 2018-09-21 DIAGNOSIS — M51.36 DDD (DEGENERATIVE DISC DISEASE), LUMBAR: ICD-10-CM

## 2018-09-21 PROCEDURE — G8984 CARRY CURRENT STATUS: HCPCS | Performed by: PHYSICAL THERAPIST

## 2018-09-21 PROCEDURE — 97162 PT EVAL MOD COMPLEX 30 MIN: CPT | Performed by: PHYSICAL THERAPIST

## 2018-09-21 PROCEDURE — 97110 THERAPEUTIC EXERCISES: CPT | Performed by: PHYSICAL THERAPIST

## 2018-09-21 PROCEDURE — 97140 MANUAL THERAPY 1/> REGIONS: CPT | Performed by: PHYSICAL THERAPIST

## 2018-09-21 PROCEDURE — G8985 CARRY GOAL STATUS: HCPCS | Performed by: PHYSICAL THERAPIST

## 2018-09-21 NOTE — PATIENT INSTRUCTIONS
Access Code: [email or web address]   URL: [email or web address]   Date: 09/21/2018   Prepared by: Guerda Bhat     Exercises   Cervical Retraction at Wall - 10 reps - 1 sets - 5 sec. hold - 1x daily - 7x weekly   Shoulder Extension with Resistance - 10 reps - 1 sets - 5 sec. hold - 1x daily - 7x weekly   Standing Shoulder Row with Anchored Resistance - 10 reps - 1 sets - 5 sec. hold - 1x daily - 7x weekly   Seated Upper Trapezius Stretch - 4 reps - 1 sets - 15 sec. hold - 1x daily - 7x weekly

## 2018-09-21 NOTE — PROGRESS NOTES
Physical Therapy Initial Evaluation and Plan of Care      Patient: Chepe Arias   : 1945  Diagnosis/ICD-10 Code:  Neck pain [M54.2]  Referring practitioner: David Franks MD  Date of Initial Visit: 2018  Today's Date: 2018  Patient seen for 1 sessions           Subjective Questionnaire: NDI      Subjective Evaluation    History of Present Illness  Date of onset: 2018  Mechanism of injury: Pt has insidious onset of neck pain over the last several months.  This pain occurs with movements such as rotation and side bending.  This dull ache in his neck does not have any radiating symptoms down the arm, but usually occurs to the side he is looking towards. The right side is more painful than left.  This impairment has not limited him in any activities but is described as bothersome.      PMH: Fractured vertebrae in lower thoracic repaired with arnoldo, pre-DM, high cholesterol, anxiety,  Lumbar DDD, and cochlear ear implants    Subjective comment: Pt reports minor neck pain with movements.   Patient Occupation: retired    Precautions and Work Restrictions: noneQuality of life: good    Pain  Current pain ratin  At worst pain ratin  Location: lower cervical/ upper trap  Quality: dull ache  Relieving factors: change in position and medications  Aggravating factors: repetitive movement and prolonged positioning (Rotating/ SB head to the right)  Progression: no change    Social Support  Lives in: multiple-level home  Lives with: alone    Hand dominance: right    Diagnostic Tests  No diagnostic tests performed    Treatments  No previous or current treatments  Patient Goals  Patient goals for therapy: decreased pain, increased motion, return to sport/leisure activities, independence with ADLs/IADLs and increased strength  Patient goal: Education on HEP           Objective       Postural Observations    Additional Postural Observation Details  Forward head, rounded shoulders     Palpation    Left   Tenderness of the suboccipitals and upper trapezius.     Right Tenderness of the suboccipitals and upper trapezius.     Active Range of Motion   Cervical/Thoracic Spine   Cervical    Flexion: 60 degrees   Extension: 45 degrees   Left lateral flexion: 20 degrees with pain  Right lateral flexion: 16 degrees with pain  Left rotation: 38 degrees with pain  Right rotation: 48 degrees     Strength/Myotome Testing   Cervical Spine     Left   Normal strength    Right   Normal strength    Tests   Cervical     Left   Positive cervical distraction.   Negative Spurling's sign.     Right   Positive cervical distraction.   Negative Spurling's sign.     Additional Tests Details  Bilateral Upper trap/Levator Scap limitations          Assessment & Plan     Assessment  Impairments: abnormal muscle tone, abnormal or restricted ROM, activity intolerance, lacks appropriate home exercise program and pain with function  Assessment details: Chepe Arias is a pleasant 73 y.o. male that presents with signs and symptoms consistent with the above diagnosis. Pt would benefit from skilled PT services in order to address listed impairments and increase tolerance to normal daily activities including ADLs, IADLs,  and recreational activities.  Barriers to therapy: PMH  Prognosis: good  Prognosis details: Patient demonstrates good rehab potential as evidenced by high motivation to participate with PT POC/HEP and to return to PLOF/ADLs/IADLs/recreational activities  Functional Limitations: uncomfortable because of pain  Goals  Plan Goals: Plan Goals: Short Term Goals (2-3 wks):  1.  Patient will have increased cervical spine ROM to WFLs to allow for increased functional joint mobility.  2.  Patient will have increased cervical spine and thoracic spine segmental mobility WFLs to increase cervical ROM.  3.  Patient will have decreased pain to 0/10 to allow for increased comfort with functional activities including exercises.   4.  Patient  will have increased cervical spine muscle flexibility to WNLs.    Long Term Goals (4-6wks):  1.  Patient will be independent in performance of HEP for carryover upon discharge from skilled PT services.  2.  Patient will have increased NDI score of 2/50 or better.  3.  Patient will report return to performance of ADLs/Work/Sport/Leisure activities with minimal to no symptom reproduction/pain limitations.    Plan  Therapy options: will be seen for skilled physical therapy services  Planned modality interventions: cryotherapy, TENS, thermotherapy (hydrocollator packs), high voltage pulsed current (pain management), high voltage pulsed current (spasm management), traction and ultrasound  Planned therapy interventions: abdominal trunk stabilization, manual therapy, ADL retraining, body mechanics training, postural training, flexibility, soft tissue mobilization, spinal/joint mobilization, strengthening, stretching, functional ROM exercises, home exercise program, joint mobilization and therapeutic activities  Frequency: 2x week  Duration in visits: 12  Treatment plan discussed with: patient  Plan details: Pt was educated about HEP and importance of adherence to it.  Pt goals were established and plan was discussed with the patient.        Manual Therapy:    15     mins  38731;  Therapeutic Exercise:    8     mins  83857;     Neuromuscular Randi:        mins  66828;    Therapeutic Activity:          mins  06579;     Gait Training:           mins  68021;     Ultrasound:          mins  69607;    Electrical Stimulation:         mins  21649 ( );  Dry Needling          mins self-pay    Timed Treatment:   23   mins   Total Treatment:     45   mins    PT SIGNATURE: Guerda Bhat, PT   DATE TREATMENT INITIATED: 9/21/2018    Initial Certification  Certification Period: 12/20/2018  I certify that the therapy services are furnished while this patient is under my care.  The services outlined above are required by this patient,  and will be reviewed every 90 days.     PHYSICIAN: David Franks MD      DATE:     Please sign and return via fax to [phone].. Thank you, [facility].

## 2018-09-24 ENCOUNTER — TREATMENT (OUTPATIENT)
Dept: PHYSICAL THERAPY | Facility: CLINIC | Age: 73
End: 2018-09-24

## 2018-09-24 DIAGNOSIS — M54.2 NECK PAIN: Primary | ICD-10-CM

## 2018-09-24 DIAGNOSIS — M51.36 DDD (DEGENERATIVE DISC DISEASE), LUMBAR: ICD-10-CM

## 2018-09-24 PROCEDURE — 97110 THERAPEUTIC EXERCISES: CPT | Performed by: PHYSICAL THERAPIST

## 2018-09-24 PROCEDURE — 97140 MANUAL THERAPY 1/> REGIONS: CPT | Performed by: PHYSICAL THERAPIST

## 2018-09-24 NOTE — PROGRESS NOTES
Physical Therapy Daily Progress Note      Patient: Chepe Arias   : 1945  Diagnosis/ICD-10 Code:  Neck pain [M54.2]  Referring practitioner: David Franks MD  Date of Initial Visit: Type: THERAPY  Noted: 2018  Today's Date: 2018  Patient seen for 2 sessions         Chepe Arias reports:       Subjective   Patient reports his neck is fairly sore today but that is typical with rainy weather.    Objective   See Exercise, Manual, and Modality Logs for complete treatment.       Assessment/Plan  Patient tolerated treatment well with decreased cervical/upper thoracic region pain post treatment.  No adverse symptoms to exercise progression.  Progressed HEP.  Progress per Plan of Care           Manual Therapy:    13     mins  37157;  Therapeutic Exercise:    10     mins  38611;     Neuromuscular Randi:        mins  73576;    Therapeutic Activity:          mins  21262;     Gait Training:           mins  85262;     Ultrasound:          mins  48228;    Electrical Stimulation:         mins  92528 ( );  Dry Needling          mins self-pay    Timed Treatment:   23   mins   Total Treatment:     35   mins    Guerda Bhat, PT  Physical Therapist

## 2018-09-24 NOTE — PROGRESS NOTES
I have reviewed the notes, assessments, and/or procedures performed by Agustín Erwin Student PT, I concur with her/his documentation of Chepe Arias.

## 2018-09-27 ENCOUNTER — TREATMENT (OUTPATIENT)
Dept: PHYSICAL THERAPY | Facility: CLINIC | Age: 73
End: 2018-09-27

## 2018-09-27 DIAGNOSIS — M51.36 DDD (DEGENERATIVE DISC DISEASE), LUMBAR: ICD-10-CM

## 2018-09-27 DIAGNOSIS — M54.2 NECK PAIN: Primary | ICD-10-CM

## 2018-09-27 PROCEDURE — 97140 MANUAL THERAPY 1/> REGIONS: CPT | Performed by: PHYSICAL THERAPIST

## 2018-09-27 PROCEDURE — 97110 THERAPEUTIC EXERCISES: CPT | Performed by: PHYSICAL THERAPIST

## 2018-09-27 NOTE — PROGRESS NOTES
Physical Therapy Daily Progress Note      Patient: Chepe Arias   : 1945  Diagnosis/ICD-10 Code:  Neck pain [M54.2]  Referring practitioner: David Franks MD  Date of Initial Visit: Type: THERAPY  Noted: 2018  Today's Date: 2018  Patient seen for 3 sessions         Chepe Arias reports:    Subjective   Pt reported that he is performing his HEP at home and still feels tightness on the right side of his neck.     Objective   See Exercise, Manual, and Modality Logs for complete treatment.       Assessment/Plan  Pt is tolerating treatment and having reported decreased pain after therapy.  Pt has increased muscle spasm in right upper in middle trap which was relieved with STM.  Pt has limited SBing and rotation but benefited largely from manual therapy in rotation. Pt was educated about HEP and how to properly perform exercises.      Progress per Plan of Care           Manual Therapy:    18     mins  58653;  Therapeutic Exercise:    12     mins  88501;     Neuromuscular Randi:        mins  44127;    Therapeutic Activity:          mins  26914;     Gait Training:           mins  96713;     Ultrasound:          mins  94617;    Electrical Stimulation:         mins  15612 ( );  Dry Needling          mins self-pay    Timed Treatment:   30   mins   Total Treatment:     30   mins    Guerda Bhat, PT  Physical Therapist

## 2018-10-02 ENCOUNTER — TREATMENT (OUTPATIENT)
Dept: PHYSICAL THERAPY | Facility: CLINIC | Age: 73
End: 2018-10-02

## 2018-10-02 DIAGNOSIS — M54.2 NECK PAIN: Primary | ICD-10-CM

## 2018-10-02 DIAGNOSIS — M51.36 DDD (DEGENERATIVE DISC DISEASE), LUMBAR: ICD-10-CM

## 2018-10-02 PROCEDURE — 97110 THERAPEUTIC EXERCISES: CPT | Performed by: PHYSICAL THERAPIST

## 2018-10-02 PROCEDURE — 97140 MANUAL THERAPY 1/> REGIONS: CPT | Performed by: PHYSICAL THERAPIST

## 2018-10-02 NOTE — PROGRESS NOTES
Physical Therapy Daily Progress Note      Patient: Chepe Arias   : 1945  Diagnosis/ICD-10 Code:  Neck pain [M54.2]  Referring practitioner: David Franks MD  Date of Initial Visit: Type: THERAPY  Noted: 2018  Today's Date: 10/2/2018  Patient seen for 4 sessions         Chepe Arias reports:     Subjective   Pt reports that he feels good expect of one spot on the right side of his neck that is tight, even though he has trouble reproducing pain.  Pt continues to perform HEP.    Objective   See Exercise, Manual, and Modality Logs for complete treatment.       Assessment/Plan  Pt is tolerating therapy well including manual stretching and mobilization.  Pts pain complaint is in a trigger point in the right upper trap.  Pt benefited from strengthening exercises and will continue to progress through therapy.    Progress per Plan of Care           Manual Therapy:    18     mins  04735;  Therapeutic Exercise:    12     mins  19212;     Neuromuscular Randi:        mins  63786;    Therapeutic Activity:          mins  84077;     Gait Training:           mins  22568;     Ultrasound:          mins  96513;    Electrical Stimulation:         mins  98831 ( );  Dry Needling          mins self-pay    Timed Treatment:   30   mins   Total Treatment:     40   mins    Guerda Bhat, PT  Physical Therapist

## 2018-10-08 ENCOUNTER — TREATMENT (OUTPATIENT)
Dept: PHYSICAL THERAPY | Facility: CLINIC | Age: 73
End: 2018-10-08

## 2018-10-08 DIAGNOSIS — M51.36 DDD (DEGENERATIVE DISC DISEASE), LUMBAR: ICD-10-CM

## 2018-10-08 DIAGNOSIS — M54.2 NECK PAIN: Primary | ICD-10-CM

## 2018-10-08 PROCEDURE — 97035 APP MDLTY 1+ULTRASOUND EA 15: CPT | Performed by: PHYSICAL THERAPIST

## 2018-10-08 PROCEDURE — 97140 MANUAL THERAPY 1/> REGIONS: CPT | Performed by: PHYSICAL THERAPIST

## 2018-10-08 PROCEDURE — 97110 THERAPEUTIC EXERCISES: CPT | Performed by: PHYSICAL THERAPIST

## 2018-10-08 NOTE — PROGRESS NOTES
Physical Therapy Daily Progress Note      Patient: Chepe Arias   : 1945  Diagnosis/ICD-10 Code:  Neck pain [M54.2]  Referring practitioner: David Franks MD  Date of Initial Visit: Type: THERAPY  Noted: 2018  Today's Date: 10/8/2018  Patient seen for 5 sessions         Chepe Arias reports:    Subjective   Pt reports that he has been traveling a lot since last time he was in, and was unable to perform HEP.  Pt does not report any new pain just the occasional tightness on the right side.   Objective   See Exercise, Manual, and Modality Logs for complete treatment.       Assessment/Plan  Pt tolerated manual therapy well and benefited from STM on right upper trap.  Pt will continue to strengthen scapular retractors to help posture and take some of the pressure off of the upper trap.  Pt was educated about HEP.     Progress per Plan of Care           Manual Therapy:    15     mins  85354;  Therapeutic Exercise:    15     mins  95407;     Neuromuscular Randi:        mins  33249;    Therapeutic Activity:          mins  91984;     Gait Training:           mins  82283;     Ultrasound:          mins  20214;    Electrical Stimulation:         mins  62290 ( );  Dry Needling          mins self-pay    Timed Treatment:   40   mins   Total Treatment:     40   mins    Guerda Bhat, PT  Physical Therapist

## 2018-10-11 ENCOUNTER — TREATMENT (OUTPATIENT)
Dept: PHYSICAL THERAPY | Facility: CLINIC | Age: 73
End: 2018-10-11

## 2018-10-11 DIAGNOSIS — M54.2 NECK PAIN: Primary | ICD-10-CM

## 2018-10-11 DIAGNOSIS — M51.36 DDD (DEGENERATIVE DISC DISEASE), LUMBAR: ICD-10-CM

## 2018-10-11 PROCEDURE — 97110 THERAPEUTIC EXERCISES: CPT | Performed by: PHYSICAL THERAPIST

## 2018-10-11 PROCEDURE — 97140 MANUAL THERAPY 1/> REGIONS: CPT | Performed by: PHYSICAL THERAPIST

## 2018-10-11 NOTE — PROGRESS NOTES
Physical Therapy Daily Progress Note      Patient: Chepe Arias   : 1945  Diagnosis/ICD-10 Code:  Neck pain [M54.2]  Referring practitioner: David Franks MD  Date of Initial Visit: Type: THERAPY  Noted: 2018  Today's Date: 10/11/2018  Patient seen for 6 sessions         Chepe Arias reports:    Subjective   Pt reports with more pain than usual as he spent hours working on his computer the other day with poor posture. Pt also reported some low back pain which is not usual.    Objective   See Exercise, Manual, and Modality Logs for complete treatment.       Assessment/Plan  Pt responded well to manual therapy to help relieve pain in both cervical and lumbar. Pt benefited from IASTM on right upper trap as pt had major trigger point.  Pt educated about HEP to help maintain motion and pain level that is achieved in the clinic.   Progress per Plan of Care           Manual Therapy:    20     mins  09377;  Therapeutic Exercise:    10     mins  20284;     Neuromuscular Randi:        mins  34145;    Therapeutic Activity:          mins  38390;     Gait Training:           mins  51190;     Ultrasound:          mins  26155;    Electrical Stimulation:         mins  88982 ( );  Dry Needling          mins self-pay    Timed Treatment:   30   mins   Total Treatment:     30   mins    Guerda Bhat, PT  Physical Therapist

## 2018-10-17 ENCOUNTER — TREATMENT (OUTPATIENT)
Dept: PHYSICAL THERAPY | Facility: CLINIC | Age: 73
End: 2018-10-17

## 2018-10-17 DIAGNOSIS — M54.2 NECK PAIN: Primary | ICD-10-CM

## 2018-10-17 DIAGNOSIS — M51.36 DDD (DEGENERATIVE DISC DISEASE), LUMBAR: ICD-10-CM

## 2018-10-17 DIAGNOSIS — E78.2 MIXED HYPERLIPIDEMIA: ICD-10-CM

## 2018-10-17 DIAGNOSIS — R73.03 PREDIABETES: ICD-10-CM

## 2018-10-17 PROCEDURE — 97110 THERAPEUTIC EXERCISES: CPT | Performed by: PHYSICAL THERAPIST

## 2018-10-17 PROCEDURE — 97140 MANUAL THERAPY 1/> REGIONS: CPT | Performed by: PHYSICAL THERAPIST

## 2018-10-17 RX ORDER — SIMVASTATIN 10 MG
TABLET ORAL
Qty: 30 TABLET | Refills: 0 | Status: SHIPPED | OUTPATIENT
Start: 2018-10-17 | End: 2018-11-11 | Stop reason: SDUPTHER

## 2018-10-18 NOTE — PROGRESS NOTES
I have reviewed the notes, assessments, and/or procedures performed by Yovany Erwin Student PT, I concur with her/his documentation of Chepe Arias.

## 2018-11-11 DIAGNOSIS — R73.03 PREDIABETES: ICD-10-CM

## 2018-11-11 DIAGNOSIS — E78.2 MIXED HYPERLIPIDEMIA: ICD-10-CM

## 2018-11-12 RX ORDER — SIMVASTATIN 10 MG
TABLET ORAL
Qty: 30 TABLET | Refills: 0 | Status: SHIPPED | OUTPATIENT
Start: 2018-11-12 | End: 2018-12-04 | Stop reason: SDUPTHER

## 2018-12-04 DIAGNOSIS — E78.2 MIXED HYPERLIPIDEMIA: ICD-10-CM

## 2018-12-04 DIAGNOSIS — R73.03 PREDIABETES: ICD-10-CM

## 2018-12-04 RX ORDER — SIMVASTATIN 10 MG
TABLET ORAL
Qty: 30 TABLET | Refills: 0 | Status: SHIPPED | OUTPATIENT
Start: 2018-12-04 | End: 2019-01-09 | Stop reason: SDUPTHER

## 2019-01-09 DIAGNOSIS — E78.2 MIXED HYPERLIPIDEMIA: ICD-10-CM

## 2019-01-09 DIAGNOSIS — R73.03 PREDIABETES: ICD-10-CM

## 2019-01-09 RX ORDER — SIMVASTATIN 10 MG
10 TABLET ORAL NIGHTLY
Qty: 90 TABLET | Refills: 3 | Status: SHIPPED | OUTPATIENT
Start: 2019-01-09 | End: 2019-12-19

## 2019-04-17 ENCOUNTER — DOCUMENTATION (OUTPATIENT)
Dept: PHYSICAL THERAPY | Facility: CLINIC | Age: 74
End: 2019-04-17

## 2019-08-02 ENCOUNTER — OFFICE VISIT (OUTPATIENT)
Dept: SPORTS MEDICINE | Facility: CLINIC | Age: 74
End: 2019-08-02

## 2019-08-02 VITALS
OXYGEN SATURATION: 95 % | BODY MASS INDEX: 26.22 KG/M2 | HEART RATE: 58 BPM | WEIGHT: 173 LBS | DIASTOLIC BLOOD PRESSURE: 84 MMHG | SYSTOLIC BLOOD PRESSURE: 136 MMHG | HEIGHT: 68 IN

## 2019-08-02 DIAGNOSIS — L03.012 PARONYCHIA OF FINGER OF LEFT HAND: Primary | ICD-10-CM

## 2019-08-02 PROCEDURE — 99213 OFFICE O/P EST LOW 20 MIN: CPT | Performed by: FAMILY MEDICINE

## 2019-08-02 RX ORDER — CEPHALEXIN 500 MG/1
500 CAPSULE ORAL 2 TIMES DAILY
Qty: 20 CAPSULE | Refills: 0 | Status: SHIPPED | OUTPATIENT
Start: 2019-08-02 | End: 2020-01-27

## 2019-08-02 NOTE — PROGRESS NOTES
"Chepe is a 74 y.o. year old male evaluation of a problem that is new to this examiner.    Chief Complaint   Patient presents with   • ingrown Finger nail LT hand     x few years       History of Present Illness   HPI   Here today for recurrent problem with the left index finger, has had ingrowing nail on the radial aspect multiple times.  Recently with coming and going erythema and swelling.  Mild to moderately severe.  Better with cutting away the tissue on his own at home.    I have reviewed the patient's medical, family, and social history in detail and updated the computerized patient record.    Review of Systems   Constitutional: Negative for fever.       /84   Pulse 58   Ht 172.7 cm (67.99\")   Wt 78.5 kg (173 lb)   SpO2 95%   BMI 26.31 kg/m²      Physical Exam   Constitutional: He appears well-developed and well-nourished.   Skin:   Left index finger: The radial aspect of the nail has been clearly resected in the past, at the base there is a large amount of hypertrophic, keratotic skin overlying the regrowth of the nail itself.  There is no drainage or surrounding erythema.   Psychiatric: He has a normal mood and affect.   Vitals reviewed.        Current Outpatient Medications:   •  ALPRAZolam (XANAX) 0.5 MG tablet, Take  by mouth., Disp: , Rfl:   •  Cholecalciferol (VITAMIN D) 2000 UNITS capsule, Take  by mouth., Disp: , Rfl:   •  famotidine (PEPCID) 20 MG tablet, Take  by mouth., Disp: , Rfl:   •  FLUoxetine (PROzac) 20 MG capsule, , Disp: , Rfl:   •  Magnesium 400 MG capsule, Take  by mouth., Disp: , Rfl:   •  Multiple Minerals-Vitamins (CALCIUM CITRATE +) tablet, Take  by mouth., Disp: , Rfl:   •  MULTIPLE VITAMIN PO, Take  by mouth., Disp: , Rfl:   •  Omega-3 Fatty Acids (FISH OIL) 1000 MG capsule capsule, Take  by mouth., Disp: , Rfl:   •  simvastatin (ZOCOR) 10 MG tablet, Take 1 tablet by mouth Every Night., Disp: 90 tablet, Rfl: 3  •  cephalexin (KEFLEX) 500 MG capsule, Take 1 capsule by " mouth 2 (Two) Times a Day., Disp: 20 capsule, Rfl: 0     Diagnoses and all orders for this visit:    Paronychia of finger of left hand  -     cephalexin (KEFLEX) 500 MG capsule; Take 1 capsule by mouth 2 (Two) Times a Day.    Hyperkeratotic skin at the base of the nail I did a gentle debridement of the with blunt forceps.  Applied a basic bandage.  Recommend soaks with Epsom salts twice daily, cuticle care, Keflex.  If not improving he may need the matrix destroyed.        EMR Dragon/Transcription disclaimer:    Much of this encounter note is an electronic transcription/translation of spoken language to printed text.  The electronic translation of spoken language may permit erroneous, or at times, nonsensical words or phrases to be inadvertently transcribed.  Although I have reviewed the note for such errors some may still exist.

## 2019-12-19 DIAGNOSIS — R73.03 PREDIABETES: ICD-10-CM

## 2019-12-19 DIAGNOSIS — E78.2 MIXED HYPERLIPIDEMIA: ICD-10-CM

## 2019-12-19 RX ORDER — SIMVASTATIN 10 MG
TABLET ORAL
Qty: 90 TABLET | Refills: 0 | Status: SHIPPED | OUTPATIENT
Start: 2019-12-19 | End: 2020-01-27 | Stop reason: SDUPTHER

## 2020-01-27 ENCOUNTER — OFFICE VISIT (OUTPATIENT)
Dept: SPORTS MEDICINE | Facility: CLINIC | Age: 75
End: 2020-01-27

## 2020-01-27 VITALS
HEIGHT: 68 IN | OXYGEN SATURATION: 95 % | BODY MASS INDEX: 26.83 KG/M2 | SYSTOLIC BLOOD PRESSURE: 132 MMHG | HEART RATE: 60 BPM | DIASTOLIC BLOOD PRESSURE: 76 MMHG | WEIGHT: 177 LBS

## 2020-01-27 DIAGNOSIS — Z12.5 SCREENING FOR PROSTATE CANCER: ICD-10-CM

## 2020-01-27 DIAGNOSIS — Z00.00 MEDICARE ANNUAL WELLNESS VISIT, SUBSEQUENT: Primary | ICD-10-CM

## 2020-01-27 DIAGNOSIS — F41.9 ANXIETY DISORDER, UNSPECIFIED TYPE: ICD-10-CM

## 2020-01-27 DIAGNOSIS — Z86.010 HISTORY OF COLON POLYPS: ICD-10-CM

## 2020-01-27 DIAGNOSIS — E78.2 MIXED HYPERLIPIDEMIA: ICD-10-CM

## 2020-01-27 DIAGNOSIS — R73.03 PREDIABETES: ICD-10-CM

## 2020-01-27 DIAGNOSIS — Z12.11 SCREEN FOR COLON CANCER: ICD-10-CM

## 2020-01-27 PROCEDURE — G0439 PPPS, SUBSEQ VISIT: HCPCS | Performed by: FAMILY MEDICINE

## 2020-01-27 RX ORDER — SIMVASTATIN 10 MG
10 TABLET ORAL NIGHTLY
Qty: 90 TABLET | Refills: 3 | Status: SHIPPED | OUTPATIENT
Start: 2020-01-27 | End: 2021-02-24 | Stop reason: SDUPTHER

## 2020-01-27 NOTE — PROGRESS NOTES
Subsequent Medicare Wellness Visit   The ABC's of the Annual Wellness Visit    Chief Complaint   Patient presents with   • Medicare Wellness-subsequent       HPI:  Chepe Arias, -1945, is a 74 y.o. male who presents for a Subsequent Medicare Wellness Visit.    Recent Hospitalizations:  No hospitalization(s) within the last year..    Current Medical Providers:  Patient Care Team:  David Franks MD as PCP - General (Sports Medicine)  Rocío Carrillo MD as PCP - Claims Attributed    Health Habits and Functional and Cognitive Screening and Depression Screening:  Functional & Cognitive Status 2020   Do you have difficulty preparing food and eating? No   Do you have difficulty bathing yourself, getting dressed or grooming yourself? No   Do you have difficulty using the toilet? No   Do you have difficulty moving around from place to place? No   Do you have trouble with steps or getting out of a bed or a chair? No   Current Diet Well Balanced Diet   Dental Exam Up to date   Eye Exam Not up to date   Exercise (times per week) 3 times per week   Current Exercise Activities Include Stationary Bicycling/Spin Class   Do you need help using the phone?  Yes   Are you deaf or do you have serious difficulty hearing?  Yes   Do you need help with transportation? No   Do you need help shopping? No   Do you need help preparing meals?  No   Do you need help with housework?  No   Do you need help with laundry? No   Do you need help taking your medications? No   Do you need help managing money? No   Do you ever drive or ride in a car without wearing a seat belt? No   Do you have difficulty concentrating, remembering or making decisions? No       Compared to one year ago, the patient feels his physical health is the same and his mental health is the same.    Depression Screen:  PHQ-2/PHQ-9 Depression Screening 2020   Little interest or pleasure in doing things 0   Feeling down, depressed, or hopeless 0    Trouble falling or staying asleep, or sleeping too much -   Feeling tired or having little energy -   Poor appetite or overeating -   Feeling bad about yourself - or that you are a failure or have let yourself or your family down -   Trouble concentrating on things, such as reading the newspaper or watching television -   Moving or speaking so slowly that other people could have noticed. Or the opposite - being so fidgety or restless that you have been moving around a lot more than usual -   Total Score 0   If you checked off any problems, how difficult have these problems made it for you to do your work, take care of things at home, or get along with other people? -         Past Medical/Family/Social History:  The following portions of the patient's history were reviewed and updated as appropriate: allergies, current medications, past family history, past medical history, past social history, past surgical history and problem list.    Allergies   Allergen Reactions   • Other          Current Outpatient Medications:   •  ALPRAZolam (XANAX) 0.5 MG tablet, Take  by mouth., Disp: , Rfl:   •  Cholecalciferol (VITAMIN D) 2000 UNITS capsule, Take  by mouth., Disp: , Rfl:   •  famotidine (PEPCID) 20 MG tablet, Take  by mouth., Disp: , Rfl:   •  FLUoxetine (PROzac) 20 MG capsule, , Disp: , Rfl:   •  Magnesium 400 MG capsule, Take  by mouth., Disp: , Rfl:   •  Multiple Minerals-Vitamins (CALCIUM CITRATE +) tablet, Take  by mouth., Disp: , Rfl:   •  MULTIPLE VITAMIN PO, Take  by mouth., Disp: , Rfl:   •  Omega-3 Fatty Acids (FISH OIL) 1000 MG capsule capsule, Take  by mouth., Disp: , Rfl:   •  simvastatin (ZOCOR) 10 MG tablet, TAKE 1 TABLET EVERY NIGHT, Disp: 90 tablet, Rfl: 0    Aspirin use counseling: Taking ASA appropriately as indicated    Current medication list contains no high risk medications.  No harmful drug interactions have been identified.     Family History   Problem Relation Age of Onset   • No Known Problems  "Mother    • No Known Problems Father        Social History     Tobacco Use   • Smoking status: Never Smoker   • Smokeless tobacco: Never Used   Substance Use Topics   • Alcohol use: Yes       Past Surgical History:   Procedure Laterality Date   • ADENOIDECTOMY     • BACK SURGERY         Patient Active Problem List   Diagnosis   • Hyperlipidemia   • Low back pain   • Prediabetes   • Anxiety disorder   • Annual physical exam       Review of Systems    Objective     Vitals:    01/27/20 0853   BP: 132/76   Pulse: 60   SpO2: 95%   Weight: 80.3 kg (177 lb)   Height: 172.7 cm (67.99\")       Patient's Body mass index is 26.92 kg/m². BMI is above normal parameters. Recommendations include: exercise counseling and nutrition counseling.      No exam data present    The patient has no evidence of cognitve impairment.     Physical Exam    Recent Lab Results:  Lab Results   Component Value Date     (H) 09/05/2018     Lab Results   Component Value Date    TRIG 130 09/05/2018    HDL 45 09/05/2018    VLDL 26 09/05/2018       Assessment/Plan   Age-appropriate Screening Schedule:  Refer to the list below for future screening recommendations based on patient's age, sex and/or medical conditions.      Health Maintenance   Topic Date Due   • ZOSTER VACCINE (2 of 2) 09/08/2016   • COLONOSCOPY  10/03/2018   • LIPID PANEL  09/05/2019   • TDAP/TD VACCINES (2 - Td) 01/01/2020   • INFLUENZA VACCINE  Completed       Medicare Risks and Personalized Health Plan:  Cardiovascular risk  Colon Cancer Screening  Depression/Dysphoria  Diabetic Lab Screening   Immunizations Discussed/Encouraged (specific immunizations; adacel Tdap, Influenza and Shingrix )  Prostate Cancer Screening       CMS-Preventive Services Quick Reference  Medicare Preventive Services Addressed:  Annual Wellness Visit (AWV)  Diabetes Screening-Lab Order for either glucose quantitative blood (except reagent strip), glucose;post glucose dose(includes glucose), or glucose " tolerance test-3 specimens(includes glucose)  Prostate Cancer Screening     Advance Care Planning:  Patient has an advance directive - a copy has been provided and is visible in patient header    Diagnoses and all orders for this visit:    1. Medicare annual wellness visit, subsequent (Primary)  -     CBC & Differential  -     Comprehensive Metabolic Panel  -     Lipid Panel With / Chol / HDL Ratio  -     Thyroid Cascade Profile  -     Urinalysis With Culture If Indicated -  -     Hemoglobin A1c  -     PSA Screen  -     Ambulatory Referral For Screening Colonoscopy    2. Mixed hyperlipidemia  -     CBC & Differential  -     Comprehensive Metabolic Panel  -     Lipid Panel With / Chol / HDL Ratio  -     Thyroid Cascade Profile  -     Urinalysis With Culture If Indicated -  -     Hemoglobin A1c  -     PSA Screen    3. Prediabetes  -     CBC & Differential  -     Comprehensive Metabolic Panel  -     Lipid Panel With / Chol / HDL Ratio  -     Thyroid Cascade Profile  -     Urinalysis With Culture If Indicated -  -     Hemoglobin A1c  -     PSA Screen    4. Anxiety disorder, unspecified type  -     CBC & Differential  -     Comprehensive Metabolic Panel  -     Lipid Panel With / Chol / HDL Ratio  -     Thyroid Cascade Profile  -     Urinalysis With Culture If Indicated -  -     Hemoglobin A1c  -     PSA Screen    5. Screening for prostate cancer  -     CBC & Differential  -     Comprehensive Metabolic Panel  -     Lipid Panel With / Chol / HDL Ratio  -     Thyroid Cascade Profile  -     Urinalysis With Culture If Indicated -  -     Hemoglobin A1c  -     PSA Screen    6. History of colon polyps  -     Ambulatory Referral For Screening Colonoscopy    7. Screen for colon cancer  -     Ambulatory Referral For Screening Colonoscopy        An After Visit Summary and PPPS with all of these plans were given to the patient.      Follow Up:  No follow-ups on file.        Weaning slowly off xanax with his psychiatrist. Has remained  stable well.   Walking on treadmill, using rower about 3 days a week for exercise.

## 2020-01-29 LAB
ALBUMIN SERPL-MCNC: 4.7 G/DL (ref 3.7–4.7)
ALBUMIN/GLOB SERPL: 1.9 {RATIO} (ref 1.2–2.2)
ALP SERPL-CCNC: 66 IU/L (ref 39–117)
ALT SERPL-CCNC: 24 IU/L (ref 0–44)
APPEARANCE UR: CLEAR
AST SERPL-CCNC: 27 IU/L (ref 0–40)
BACTERIA #/AREA URNS HPF: ABNORMAL /[HPF]
BACTERIA UR CULT: NORMAL
BACTERIA UR CULT: NORMAL
BASOPHILS # BLD AUTO: 0.1 X10E3/UL (ref 0–0.2)
BASOPHILS NFR BLD AUTO: 1 %
BILIRUB SERPL-MCNC: 1.1 MG/DL (ref 0–1.2)
BILIRUB UR QL STRIP: NEGATIVE
BUN SERPL-MCNC: 11 MG/DL (ref 8–27)
BUN/CREAT SERPL: 9 (ref 10–24)
CALCIUM SERPL-MCNC: 9.6 MG/DL (ref 8.6–10.2)
CHLORIDE SERPL-SCNC: 101 MMOL/L (ref 96–106)
CHOLEST SERPL-MCNC: 161 MG/DL (ref 100–199)
CHOLEST/HDLC SERPL: 3.3 RATIO (ref 0–5)
CO2 SERPL-SCNC: 24 MMOL/L (ref 20–29)
COLOR UR: YELLOW
CREAT SERPL-MCNC: 1.22 MG/DL (ref 0.76–1.27)
CRYSTALS URNS MICRO: ABNORMAL
EOSINOPHIL # BLD AUTO: 0.5 X10E3/UL (ref 0–0.4)
EOSINOPHIL NFR BLD AUTO: 7 %
EPI CELLS #/AREA URNS HPF: ABNORMAL /HPF (ref 0–10)
ERYTHROCYTE [DISTWIDTH] IN BLOOD BY AUTOMATED COUNT: 12.6 % (ref 11.6–15.4)
GLOBULIN SER CALC-MCNC: 2.5 G/DL (ref 1.5–4.5)
GLUCOSE SERPL-MCNC: ABNORMAL MG/DL
GLUCOSE UR QL: NEGATIVE
HBA1C MFR BLD: 5.9 % (ref 4.8–5.6)
HCT VFR BLD AUTO: 45.2 % (ref 37.5–51)
HDLC SERPL-MCNC: 49 MG/DL
HGB BLD-MCNC: 15.3 G/DL (ref 13–17.7)
HGB UR QL STRIP: NEGATIVE
IMM GRANULOCYTES # BLD AUTO: 0 X10E3/UL (ref 0–0.1)
IMM GRANULOCYTES NFR BLD AUTO: 0 %
KETONES UR QL STRIP: NEGATIVE
LDLC SERPL CALC-MCNC: 88 MG/DL (ref 0–99)
LEUKOCYTE ESTERASE UR QL STRIP: ABNORMAL
LYMPHOCYTES # BLD AUTO: 2 X10E3/UL (ref 0.7–3.1)
LYMPHOCYTES NFR BLD AUTO: 29 %
MCH RBC QN AUTO: 31.9 PG (ref 26.6–33)
MCHC RBC AUTO-ENTMCNC: 33.8 G/DL (ref 31.5–35.7)
MCV RBC AUTO: 94 FL (ref 79–97)
MICRO URNS: ABNORMAL
MONOCYTES # BLD AUTO: 0.4 X10E3/UL (ref 0.1–0.9)
MONOCYTES NFR BLD AUTO: 6 %
MUCOUS THREADS URNS QL MICRO: PRESENT
NEUTROPHILS # BLD AUTO: 3.9 X10E3/UL (ref 1.4–7)
NEUTROPHILS NFR BLD AUTO: 57 %
NITRITE UR QL STRIP: NEGATIVE
PH UR STRIP: 7 [PH] (ref 5–7.5)
PLATELET # BLD AUTO: 229 X10E3/UL (ref 150–450)
POTASSIUM SERPL-SCNC: ABNORMAL MMOL/L
PROT SERPL-MCNC: 7.2 G/DL (ref 6–8.5)
PROT UR QL STRIP: NEGATIVE
PSA SERPL-MCNC: 1.3 NG/ML (ref 0–4)
RBC # BLD AUTO: 4.79 X10E6/UL (ref 4.14–5.8)
RBC #/AREA URNS HPF: ABNORMAL /HPF (ref 0–2)
SODIUM SERPL-SCNC: 142 MMOL/L (ref 134–144)
SP GR UR: 1.02 (ref 1–1.03)
TRIGL SERPL-MCNC: 119 MG/DL (ref 0–149)
TSH SERPL DL<=0.005 MIU/L-ACNC: 1.9 UIU/ML (ref 0.45–4.5)
UNIDENT CRYS URNS QL MICRO: PRESENT
URINALYSIS REFLEX: ABNORMAL
UROBILINOGEN UR STRIP-MCNC: 0.2 MG/DL (ref 0.2–1)
VLDLC SERPL CALC-MCNC: 24 MG/DL (ref 5–40)
WBC # BLD AUTO: 6.9 X10E3/UL (ref 3.4–10.8)
WBC #/AREA URNS HPF: ABNORMAL /HPF (ref 0–5)

## 2020-01-31 ENCOUNTER — TELEPHONE (OUTPATIENT)
Dept: SPORTS MEDICINE | Facility: CLINIC | Age: 75
End: 2020-01-31

## 2020-01-31 NOTE — TELEPHONE ENCOUNTER
I am sorry, I thought that we reviewed this when he was in the office.  He has a past history of polyps and because of that was recommended to have colonoscopy every 5 years instead of every 10.

## 2020-01-31 NOTE — TELEPHONE ENCOUNTER
Patient called and would to know why there is a colonoscopy referral in his chart because he doesn't agree with that. He states that it has been 7 years since he has had one and that he should not be due until 2023. Please advise. Thank you!

## 2021-02-16 DIAGNOSIS — Z00.00 MEDICARE ANNUAL WELLNESS VISIT, SUBSEQUENT: ICD-10-CM

## 2021-02-16 DIAGNOSIS — Z12.5 SCREENING PSA (PROSTATE SPECIFIC ANTIGEN): Primary | ICD-10-CM

## 2021-02-18 LAB
ALBUMIN SERPL-MCNC: 4.3 G/DL (ref 3.5–5.2)
ALBUMIN/GLOB SERPL: 1.7 G/DL
ALP SERPL-CCNC: 66 U/L (ref 39–117)
ALT SERPL-CCNC: 20 U/L (ref 1–41)
APPEARANCE UR: CLEAR
AST SERPL-CCNC: 29 U/L (ref 1–40)
BASOPHILS # BLD AUTO: 0.1 10*3/MM3 (ref 0–0.2)
BASOPHILS NFR BLD AUTO: 1.4 % (ref 0–1.5)
BILIRUB SERPL-MCNC: 1.2 MG/DL (ref 0–1.2)
BILIRUB UR QL STRIP: NEGATIVE
BUN SERPL-MCNC: 11 MG/DL (ref 8–23)
BUN/CREAT SERPL: 9.8 (ref 7–25)
CALCIUM SERPL-MCNC: 9.7 MG/DL (ref 8.6–10.5)
CHLORIDE SERPL-SCNC: 103 MMOL/L (ref 98–107)
CHOLEST SERPL-MCNC: 151 MG/DL (ref 0–200)
CO2 SERPL-SCNC: 27.1 MMOL/L (ref 22–29)
COLOR UR: YELLOW
CREAT SERPL-MCNC: 1.12 MG/DL (ref 0.76–1.27)
EOSINOPHIL # BLD AUTO: 0.4 10*3/MM3 (ref 0–0.4)
EOSINOPHIL NFR BLD AUTO: 5.7 % (ref 0.3–6.2)
ERYTHROCYTE [DISTWIDTH] IN BLOOD BY AUTOMATED COUNT: 11.8 % (ref 12.3–15.4)
GLOBULIN SER CALC-MCNC: 2.5 GM/DL
GLUCOSE SERPL-MCNC: 121 MG/DL (ref 65–99)
GLUCOSE UR QL: NEGATIVE
HBA1C MFR BLD: 5.8 % (ref 4.8–5.6)
HCT VFR BLD AUTO: 43.6 % (ref 37.5–51)
HDLC SERPL-MCNC: 45 MG/DL (ref 40–60)
HGB BLD-MCNC: 14.5 G/DL (ref 13–17.7)
HGB UR QL STRIP: NEGATIVE
IMM GRANULOCYTES # BLD AUTO: 0.01 10*3/MM3 (ref 0–0.05)
IMM GRANULOCYTES NFR BLD AUTO: 0.1 % (ref 0–0.5)
KETONES UR QL STRIP: ABNORMAL
LDLC SERPL CALC-MCNC: 86 MG/DL (ref 0–100)
LEUKOCYTE ESTERASE UR QL STRIP: NEGATIVE
LYMPHOCYTES # BLD AUTO: 1.91 10*3/MM3 (ref 0.7–3.1)
LYMPHOCYTES NFR BLD AUTO: 27.4 % (ref 19.6–45.3)
MCH RBC QN AUTO: 31.7 PG (ref 26.6–33)
MCHC RBC AUTO-ENTMCNC: 33.3 G/DL (ref 31.5–35.7)
MCV RBC AUTO: 95.4 FL (ref 79–97)
MONOCYTES # BLD AUTO: 0.46 10*3/MM3 (ref 0.1–0.9)
MONOCYTES NFR BLD AUTO: 6.6 % (ref 5–12)
NEUTROPHILS # BLD AUTO: 4.1 10*3/MM3 (ref 1.7–7)
NEUTROPHILS NFR BLD AUTO: 58.8 % (ref 42.7–76)
NITRITE UR QL STRIP: NEGATIVE
NRBC BLD AUTO-RTO: 0 /100 WBC (ref 0–0.2)
PH UR STRIP: 5.5 [PH] (ref 5–8)
PLATELET # BLD AUTO: 208 10*3/MM3 (ref 140–450)
POTASSIUM SERPL-SCNC: 4.4 MMOL/L (ref 3.5–5.2)
PROT SERPL-MCNC: 6.8 G/DL (ref 6–8.5)
PROT UR QL STRIP: NEGATIVE
PSA SERPL-MCNC: 1.27 NG/ML (ref 0–4)
RBC # BLD AUTO: 4.57 10*6/MM3 (ref 4.14–5.8)
SODIUM SERPL-SCNC: 140 MMOL/L (ref 136–145)
SP GR UR: 1.02 (ref 1–1.03)
TRIGL SERPL-MCNC: 111 MG/DL (ref 0–150)
TSH SERPL DL<=0.005 MIU/L-ACNC: 1.77 UIU/ML (ref 0.45–4.5)
UROBILINOGEN UR STRIP-MCNC: ABNORMAL MG/DL
VLDLC SERPL CALC-MCNC: 20 MG/DL (ref 5–40)
WBC # BLD AUTO: 6.98 10*3/MM3 (ref 3.4–10.8)

## 2021-02-24 ENCOUNTER — OFFICE VISIT (OUTPATIENT)
Dept: SPORTS MEDICINE | Facility: CLINIC | Age: 76
End: 2021-02-24

## 2021-02-24 VITALS
BODY MASS INDEX: 26.76 KG/M2 | SYSTOLIC BLOOD PRESSURE: 128 MMHG | TEMPERATURE: 98.4 F | OXYGEN SATURATION: 95 % | HEART RATE: 62 BPM | WEIGHT: 176.6 LBS | HEIGHT: 68 IN | DIASTOLIC BLOOD PRESSURE: 69 MMHG | RESPIRATION RATE: 13 BRPM

## 2021-02-24 DIAGNOSIS — R73.03 PREDIABETES: ICD-10-CM

## 2021-02-24 DIAGNOSIS — Z00.00 MEDICARE ANNUAL WELLNESS VISIT, SUBSEQUENT: Primary | ICD-10-CM

## 2021-02-24 DIAGNOSIS — E78.2 MIXED HYPERLIPIDEMIA: ICD-10-CM

## 2021-02-24 DIAGNOSIS — Z12.5 SCREENING FOR PROSTATE CANCER: ICD-10-CM

## 2021-02-24 PROCEDURE — G0439 PPPS, SUBSEQ VISIT: HCPCS | Performed by: FAMILY MEDICINE

## 2021-02-24 RX ORDER — SIMVASTATIN 10 MG
10 TABLET ORAL NIGHTLY
Qty: 90 TABLET | Refills: 3 | Status: SHIPPED | OUTPATIENT
Start: 2021-02-24 | End: 2022-02-17

## 2021-02-24 NOTE — PATIENT INSTRUCTIONS
Check at your local pharmacy for:  Shingles vaccine (2 doses)  Tdap (tetanus and whooping cough) vaccine (1 dose)

## 2021-02-24 NOTE — PROGRESS NOTES
Subsequent Medicare Wellness Visit   The ABC's of the Annual Wellness Visit    Chief Complaint   Patient presents with   • Medicare Wellness-subsequent       HPI:  Chepe Arias, -1945, is a 75 y.o. male who presents for a Subsequent Medicare Wellness Visit.    Recent Hospitalizations:  No hospitalization(s) within the last year..    Current Medical Providers:  Patient Care Team:  David Franks MD as PCP - General (Sports Medicine)    Health Habits and Functional and Cognitive Screening and Depression Screening:  Functional & Cognitive Status 2021   Do you have difficulty preparing food and eating? No   Do you have difficulty bathing yourself, getting dressed or grooming yourself? No   Do you have difficulty using the toilet? No   Do you have difficulty moving around from place to place? No   Do you have trouble with steps or getting out of a bed or a chair? No   Current Diet Well Balanced Diet   Dental Exam Up to date   Eye Exam Not up to date   Exercise (times per week) 2 times per week   Current Exercise Activities Include Cardiovasular Workout on Exercise Equipment   Do you need help using the phone?  Yes   Are you deaf or do you have serious difficulty hearing?  Yes   Do you need help with transportation? No   Do you need help shopping? No   Do you need help preparing meals?  No   Do you need help with housework?  No   Do you need help with laundry? No   Do you need help taking your medications? No   Do you need help managing money? No   Do you ever drive or ride in a car without wearing a seat belt? No   Have you felt unusual stress, anger or loneliness in the last month? No   Who do you live with? Alone   If you need help, do you have trouble finding someone available to you? Yes   Have you been bothered in the last four weeks by sexual problems? No   Do you have difficulty concentrating, remembering or making decisions? Yes       Compared to one year ago, the patient feels his physical  health is the same and his mental health is the same.    Depression Screen:  PHQ-2/PHQ-9 Depression Screening 2/24/2021   Little interest or pleasure in doing things 0   Feeling down, depressed, or hopeless 0   Trouble falling or staying asleep, or sleeping too much -   Feeling tired or having little energy -   Poor appetite or overeating -   Feeling bad about yourself - or that you are a failure or have let yourself or your family down -   Trouble concentrating on things, such as reading the newspaper or watching television -   Moving or speaking so slowly that other people could have noticed. Or the opposite - being so fidgety or restless that you have been moving around a lot more than usual -   Total Score 0   If you checked off any problems, how difficult have these problems made it for you to do your work, take care of things at home, or get along with other people? -         Past Medical/Family/Social History:  The following portions of the patient's history were reviewed and updated as appropriate: allergies, current medications, past family history, past medical history, past social history, past surgical history and problem list.    Allergies   Allergen Reactions   • Other          Current Outpatient Medications:   •  ALPRAZolam (XANAX) 0.5 MG tablet, Take  by mouth., Disp: , Rfl:   •  Cholecalciferol (VITAMIN D) 2000 UNITS capsule, Take  by mouth., Disp: , Rfl:   •  FLUoxetine (PROzac) 20 MG capsule, , Disp: , Rfl:   •  Multiple Minerals-Vitamins (CALCIUM CITRATE +) tablet, Take  by mouth., Disp: , Rfl:   •  MULTIPLE VITAMIN PO, Take  by mouth., Disp: , Rfl:   •  Omega-3 Fatty Acids (FISH OIL) 1000 MG capsule capsule, Take  by mouth., Disp: , Rfl:   •  simvastatin (ZOCOR) 10 MG tablet, Take 1 tablet by mouth Every Night., Disp: 90 tablet, Rfl: 3  •  famotidine (PEPCID) 20 MG tablet, Take  by mouth., Disp: , Rfl:   •  Magnesium 400 MG capsule, Take  by mouth., Disp: , Rfl:     Aspirin use counseling: Does  "not need ASA (and currently is not on it)    Current medication list contains high risk medications. No harmful drug interactions have been identified.  Plan of action monitoring by psychiatry    Family History   Problem Relation Age of Onset   • No Known Problems Mother    • No Known Problems Father        Social History     Tobacco Use   • Smoking status: Never Smoker   • Smokeless tobacco: Never Used   Substance Use Topics   • Alcohol use: Yes       Past Surgical History:   Procedure Laterality Date   • ADENOIDECTOMY     • BACK SURGERY         Patient Active Problem List   Diagnosis   • Hyperlipidemia   • Low back pain   • Prediabetes   • Anxiety disorder   • Annual physical exam       Review of Systems   Constitutional: Negative.    Respiratory: Negative.    Cardiovascular: Negative.    Musculoskeletal:        Sciatic nerve pain lately       Objective     Vitals:    02/24/21 0806   BP: 128/69   BP Location: Left arm   Patient Position: Sitting   Cuff Size: Adult   Pulse: 62   Resp: 13   Temp: 98.4 °F (36.9 °C)   TempSrc: Oral   SpO2: 95%   Weight: 80.1 kg (176 lb 9.6 oz)   Height: 172.7 cm (68\")       Patient's Body mass index is 26.85 kg/m². BMI is above normal parameters. Recommendations include: exercise counseling and nutrition counseling.      No exam data present    The patient has no evidence of cognitve impairment.     Physical Exam    Recent Lab Results:  Lab Results   Component Value Date     (H) 02/17/2021     Lab Results   Component Value Date    TRIG 111 02/17/2021    HDL 45 02/17/2021    VLDL 20 02/17/2021       Assessment/Plan   Age-appropriate Screening Schedule:  Refer to the list below for future screening recommendations based on patient's age, sex and/or medical conditions.      Health Maintenance   Topic Date Due   • ZOSTER VACCINE (2 of 2) 09/08/2016   • COLONOSCOPY  10/03/2018   • TDAP/TD VACCINES (2 - Td) 01/01/2020   • LIPID PANEL  02/17/2022   • INFLUENZA VACCINE  Completed "       Medicare Risks and Personalized Health Plan:  Cardiovascular risk  Colon Cancer Screening  Depression/Dysphoria  Diabetic Lab Screening   Immunizations Discussed/Encouraged (specific immunizations; adacel Tdap, Shingrix and covid )  Prostate Cancer Screening       CMS-Preventive Services Quick Reference  Medicare Preventive Services Addressed:  Annual Wellness Visit (AWV)  Colorectal Cancer Screening, Cologuard Test   Diabetes Screening-Lab Order for either glucose quantitative blood (except reagent strip), glucose;post glucose dose(includes glucose), or glucose tolerance test-3 specimens(includes glucose)  Prostate Cancer Screening     Advance Care Planning:  ACP discussion was held with the patient during this visit. Patient has an advance directive in EMR which is still valid.     Diagnoses and all orders for this visit:    1. Medicare annual wellness visit, subsequent (Primary)    2. Prediabetes  -     simvastatin (ZOCOR) 10 MG tablet; Take 1 tablet by mouth Every Night.  Dispense: 90 tablet; Refill: 3    3. Mixed hyperlipidemia  -     simvastatin (ZOCOR) 10 MG tablet; Take 1 tablet by mouth Every Night.  Dispense: 90 tablet; Refill: 3    4. Screening for prostate cancer        An After Visit Summary and PPPS with all of these plans were given to the patient.      Follow Up:  No follow-ups on file.        Volunteering with hearing loss [employer].   Having intermittent GERD - used a few rounds of prilosec this year.   Wants to do cologuard instead of colonoscopy this year (last cscope 2013 normal by his report; will obtain records).   Reviewed labs; stable pre-dm; lipids controlled.

## 2021-03-02 DIAGNOSIS — Z23 IMMUNIZATION DUE: ICD-10-CM

## 2022-02-16 DIAGNOSIS — R73.03 PREDIABETES: ICD-10-CM

## 2022-02-16 DIAGNOSIS — E78.2 MIXED HYPERLIPIDEMIA: ICD-10-CM

## 2022-02-17 RX ORDER — SIMVASTATIN 10 MG
TABLET ORAL
Qty: 90 TABLET | Refills: 3 | Status: SHIPPED | OUTPATIENT
Start: 2022-02-17 | End: 2022-08-15

## 2022-04-18 ENCOUNTER — OFFICE VISIT (OUTPATIENT)
Dept: SPORTS MEDICINE | Facility: CLINIC | Age: 77
End: 2022-04-18

## 2022-04-18 VITALS
DIASTOLIC BLOOD PRESSURE: 82 MMHG | HEART RATE: 68 BPM | BODY MASS INDEX: 27.43 KG/M2 | TEMPERATURE: 97.8 F | SYSTOLIC BLOOD PRESSURE: 122 MMHG | HEIGHT: 68 IN | RESPIRATION RATE: 16 BRPM | WEIGHT: 181 LBS | OXYGEN SATURATION: 95 %

## 2022-04-18 DIAGNOSIS — Z00.00 MEDICARE ANNUAL WELLNESS VISIT, SUBSEQUENT: Primary | ICD-10-CM

## 2022-04-18 PROCEDURE — G0439 PPPS, SUBSEQ VISIT: HCPCS | Performed by: FAMILY MEDICINE

## 2022-04-18 PROCEDURE — 1160F RVW MEDS BY RX/DR IN RCRD: CPT | Performed by: FAMILY MEDICINE

## 2022-04-18 PROCEDURE — 1170F FXNL STATUS ASSESSED: CPT | Performed by: FAMILY MEDICINE

## 2022-04-18 RX ORDER — ALPRAZOLAM 0.25 MG/1
TABLET ORAL
COMMUNITY
Start: 2022-04-06

## 2022-04-18 NOTE — PROGRESS NOTES
QUICK REFERENCE INFORMATION:  The ABCs of the Annual Wellness Visit    Subsequent Medicare Wellness Visit    HEALTH RISK ASSESSMENT    1945    Recent Hospitalizations:  No hospitalization(s) within the last year..        Current Medical Providers:  Patient Care Team:  David Franks MD as PCP - General (Sports Medicine)        Smoking Status:  Social History     Tobacco Use   Smoking Status Former Smoker   • Packs/day: 1.00   • Years: 40.00   • Pack years: 40.00   • Types: Cigarettes   • Quit date: 2002   • Years since quittin.3   Smokeless Tobacco Never Used       Alcohol Consumption:  Social History     Substance and Sexual Activity   Alcohol Use Yes   • Alcohol/week: 7.0 standard drinks   • Types: 7 Cans of beer per week       Depression Screen:   PHQ-2/PHQ-9 Depression Screening 2021   Retired Total Score 0       Health Habits and Functional and Cognitive Screening:  Functional & Cognitive Status 2022   Do you have difficulty preparing food and eating? No   Do you have difficulty bathing yourself, getting dressed or grooming yourself? No   Do you have difficulty using the toilet? No   Do you have difficulty moving around from place to place? No   Do you have trouble with steps or getting out of a bed or a chair? No   Current Diet Well Balanced Diet   Dental Exam Up to date   Eye Exam Up to date   Exercise (times per week) 3 times per week   Current Exercises Include Walking;Treadmill   Current Exercise Activities Include -   Do you need help using the phone?  No   Are you deaf or do you have serious difficulty hearing?  Yes   Do you need help with transportation? No   Do you need help shopping? No   Do you need help preparing meals?  No   Do you need help with housework?  No   Do you need help with laundry? No   Do you need help taking your medications? No   Do you need help managing money? No   Do you ever drive or ride in a car without wearing a seat belt? No   Have you felt unusual  stress, anger or loneliness in the last month? -   Who do you live with? -   If you need help, do you have trouble finding someone available to you? -   Have you been bothered in the last four weeks by sexual problems? -   Do you have difficulty concentrating, remembering or making decisions? -           Does the patient have evidence of cognitive impairment? No    Aspirin use counseling: Does not need AS but is currently taking (discussed benefits vs risks and patient elects to stay on ASA)      Recent Lab Results:  CMP:  Lab Results   Component Value Date    BUN 14 04/11/2022    CREATININE 1.11 04/11/2022    EGFRIFNONA 64 02/17/2021    EGFRIFAFRI 78 02/17/2021    BCR 13 04/11/2022     04/11/2022    K 4.7 04/11/2022    CO2 23 04/11/2022    CALCIUM 9.7 04/11/2022    PROTENTOTREF 7.2 04/11/2022    ALBUMIN 4.5 04/11/2022    LABGLOBREF 2.7 04/11/2022    LABIL2 1.7 04/11/2022    BILITOT 1.2 04/11/2022    ALKPHOS 77 04/11/2022    AST 28 04/11/2022    ALT 29 04/11/2022     Lipid Panel:  Lab Results   Component Value Date    TRIG 162 (H) 04/11/2022    HDL 40 04/11/2022    VLDL 28 04/11/2022    LDLHDL 2.4 04/11/2022     HbA1c:  Lab Results   Component Value Date    HGBA1C 6.1 (H) 04/11/2022       Visual Acuity:  No exam data present    Age-appropriate Screening Schedule:  Refer to the list below for future screening recommendations based on patient's age, sex and/or medical conditions. Orders for these recommended tests are listed in the plan section. The patient has been provided with a written plan.    Health Maintenance   Topic Date Due   • INFLUENZA VACCINE  08/01/2022   • LIPID PANEL  04/11/2023   • TDAP/TD VACCINES (3 - Td or Tdap) 02/02/2032   • ZOSTER VACCINE  Addressed        Subjective   History of Present Illness    Chepe Arias is a 76 y.o. male who presents for an Subsequent Wellness Visit.    The following portions of the patient's history were reviewed and updated as appropriate: allergies, current  medications, past family history, past medical history, past social history, past surgical history and problem list.    Outpatient Medications Prior to Visit   Medication Sig Dispense Refill   • ALPRAZolam (XANAX) 0.5 MG tablet Take  by mouth.     • Cholecalciferol (VITAMIN D) 2000 UNITS capsule Take  by mouth.     • famotidine (PEPCID) 20 MG tablet Take  by mouth.     • FLUoxetine (PROzac) 20 MG capsule      • Magnesium 400 MG capsule Take  by mouth.     • Multiple Minerals-Vitamins (CALCIUM CITRATE +) tablet Take  by mouth.     • MULTIPLE VITAMIN PO Take  by mouth.     • Omega-3 Fatty Acids (FISH OIL) 1000 MG capsule capsule Take  by mouth.     • simvastatin (ZOCOR) 10 MG tablet TAKE 1 TABLET EVERY NIGHT 90 tablet 3     No facility-administered medications prior to visit.       Patient Active Problem List   Diagnosis   • Hyperlipidemia   • Low back pain   • Prediabetes   • Anxiety disorder   • Annual physical exam       Advance Care Planning:  ACP discussion was held with the patient during this visit. Patient has an advance directive in EMR which is still valid.     Identification of Risk Factors:  Risk factors include: Depression/Dysphoria  Diabetic Lab Screening   Immunizations Discussed/Encouraged (specific immunizations; Shingrix and COVID19 )  Obesity/Overweight   Prostate Cancer Screening .    Review of Systems    Compared to one year ago, the patient feels his physical health is worse. Due to weight gain, back soreness, sleep difficulty.  Compared to one year ago, the patient feels his mental health is better.    Objective     Physical Exam  Vitals reviewed.   Constitutional:       Appearance: Normal appearance.   HENT:      Head:      Comments: Cochlear implant present  Neurological:      Mental Status: He is alert.         Vitals:    04/18/22 0814   BP: 122/82   BP Location: Left arm   Patient Position: Sitting   Cuff Size: Adult   Pulse: 68   Resp: 16   Temp: 97.8 °F (36.6 °C)   SpO2: 95%   Weight: 82.1  "kg (181 lb)   Height: 172.7 cm (68\")       Patient's Body mass index is 27.52 kg/m². indicating that he is overweight (BMI 25-29.9). Patient's (Body mass index is 27.52 kg/m².) indicates that they are overweight with health conditions that include dyslipidemias . Weight is worsening. BMI is is above average; BMI management plan is completed. We discussed portion control and increasing exercise. .      Assessment/Plan   Patient Self-Management and Personalized Health Advice  The patient has been provided with information about: diet, exercise, weight management, prevention of cardiac or vascular disease and mental health concerns and preventive services including:   · Annual Wellness Visit (AWV)  · Prostate Cancer Screening .    Visit Diagnoses:  No diagnosis found.    No orders of the defined types were placed in this encounter.      Outpatient Encounter Medications as of 4/18/2022   Medication Sig Dispense Refill   • ALPRAZolam (XANAX) 0.5 MG tablet Take  by mouth.     • Cholecalciferol (VITAMIN D) 2000 UNITS capsule Take  by mouth.     • famotidine (PEPCID) 20 MG tablet Take  by mouth.     • FLUoxetine (PROzac) 20 MG capsule      • Magnesium 400 MG capsule Take  by mouth.     • Multiple Minerals-Vitamins (CALCIUM CITRATE +) tablet Take  by mouth.     • MULTIPLE VITAMIN PO Take  by mouth.     • Omega-3 Fatty Acids (FISH OIL) 1000 MG capsule capsule Take  by mouth.     • simvastatin (ZOCOR) 10 MG tablet TAKE 1 TABLET EVERY NIGHT 90 tablet 3     No facility-administered encounter medications on file as of 4/18/2022.       Reviewed use of high risk medication in the elderly: yes  Reviewed for potential of harmful drug interactions in the elderly: yes    Follow Up:  No follow-ups on file.     An After Visit Summary and PPPS with all of these plans were given to the patient.         Overall well. Difficulty falling asleep lately.   Chronic hearing difficulty, stable. Using a transcription device on his phone for " assistance. Active with his local hearing loss group.   Anxiety is doing well, weaning down alprazolam. Taking 1/2 of a 0.25mg tablet every other night.   Occasional feeling of leg weakness while mowing, resolves with rest.   Needing more tylenol for back pain, possibly due to less activity in winter.   Prediabetes - worse, discussed diet/exercise  Lipids stable

## 2022-08-15 DIAGNOSIS — R73.03 PREDIABETES: ICD-10-CM

## 2022-08-15 DIAGNOSIS — E78.2 MIXED HYPERLIPIDEMIA: ICD-10-CM

## 2022-08-15 RX ORDER — SIMVASTATIN 10 MG
TABLET ORAL
Qty: 90 TABLET | Refills: 1 | Status: SHIPPED | OUTPATIENT
Start: 2022-08-15 | End: 2023-02-10

## 2022-08-25 ENCOUNTER — OFFICE VISIT (OUTPATIENT)
Dept: SPORTS MEDICINE | Facility: CLINIC | Age: 77
End: 2022-08-25

## 2022-08-25 ENCOUNTER — TELEPHONE (OUTPATIENT)
Dept: SPORTS MEDICINE | Facility: CLINIC | Age: 77
End: 2022-08-25

## 2022-08-25 VITALS
SYSTOLIC BLOOD PRESSURE: 122 MMHG | OXYGEN SATURATION: 98 % | HEIGHT: 68 IN | BODY MASS INDEX: 26.52 KG/M2 | DIASTOLIC BLOOD PRESSURE: 54 MMHG | TEMPERATURE: 99.6 F | HEART RATE: 72 BPM | WEIGHT: 175 LBS

## 2022-08-25 DIAGNOSIS — S80.862A INSECT BITE OF LEFT LOWER LEG, INITIAL ENCOUNTER: Primary | ICD-10-CM
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
## 2022-08-25 PROCEDURE — 99213 OFFICE O/P EST LOW 20 MIN: CPT | Performed by: FAMILY MEDICINE

## 2022-08-25 NOTE — TELEPHONE ENCOUNTER
Can he be worked in with niki tomorrow? If not he should go to urgent care and not wait until Monday

## 2022-08-25 NOTE — TELEPHONE ENCOUNTER
Pt called and would like to be seen, he  declined the first available appt. He has a tick bite, that he is concerned about.     Please advise

## 2022-09-07 NOTE — PROGRESS NOTES
"Chepe is a 77 y.o. year old male    Chief Complaint   Patient presents with   • Tick Removal     Pt states that he had a tick bite about a week ago when he was on his front lawn, states that his left knee has swelled and is concerned.        History of Present Illness   HPI   Here today for an insect bite to the left lower leg, thinks maybe it was a tick    Review of Systems    /54 (BP Location: Right arm, Patient Position: Sitting, Cuff Size: Adult)   Pulse 72   Temp 99.6 °F (37.6 °C) (Temporal)   Ht 172.7 cm (67.99\")   Wt 79.4 kg (175 lb)   SpO2 98%   BMI 26.61 kg/m²          Physical Exam  Vitals reviewed.   Skin:     Comments: Small erythematous lesion with central puncture with small amount of erythema, overall 1-2 cm diameter located in the posterior left leg just below the knee           Current Outpatient Medications:   •  ALPRAZolam (XANAX) 0.25 MG tablet, , Disp: , Rfl:   •  Cholecalciferol (VITAMIN D) 2000 UNITS capsule, Take  by mouth., Disp: , Rfl:   •  famotidine (PEPCID) 20 MG tablet, Take  by mouth., Disp: , Rfl:   •  FLUoxetine (PROzac) 20 MG capsule, , Disp: , Rfl:   •  Magnesium 400 MG capsule, Take  by mouth., Disp: , Rfl:   •  Multiple Minerals-Vitamins (CALCIUM CITRATE +) tablet, Take  by mouth., Disp: , Rfl:   •  MULTIPLE VITAMIN PO, Take  by mouth., Disp: , Rfl:   •  Omega-3 Fatty Acids (FISH OIL) 1000 MG capsule capsule, Take  by mouth., Disp: , Rfl:   •  simvastatin (ZOCOR) 10 MG tablet, TAKE 1 TABLET EVERY NIGHT, Disp: 90 tablet, Rfl: 1     Diagnoses and all orders for this visit:    Insect bite of left lower leg, initial encounter       Overall appears to be benign insect bite, possibly tick but he is unsure and no sign of secondary infection.  Discussed management with topical steroids over-the-counter.  "

## 2023-02-10 DIAGNOSIS — E78.2 MIXED HYPERLIPIDEMIA: ICD-10-CM

## 2023-02-10 DIAGNOSIS — R73.03 PREDIABETES: ICD-10-CM

## 2023-02-10 RX ORDER — SIMVASTATIN 10 MG
TABLET ORAL
Qty: 90 TABLET | Refills: 1 | Status: SHIPPED | OUTPATIENT
Start: 2023-02-10

## 2023-05-09 NOTE — PROGRESS NOTES
Physical Therapy Daily Progress Note      Patient: Chepe Arias   : 1945  Diagnosis/ICD-10 Code:  Neck pain [M54.2]  Referring practitioner: David Franks MD  Date of Initial Visit: Type: THERAPY  Noted: 2018  Today's Date: 10/18/2018  Patient seen for 7 sessions         Chepe Arias reports:       Subjective   Pt reports that he has had a busy weekend volunteering and his neck is feeling sore from all the activity.    Objective   See Exercise, Manual, and Modality Logs for complete treatment.       Assessment/Plan  Pt is tolerating manual therapy with focus on his right upper trap as that is where his trigger points are causing most of his pain. Pt benefited from STM to the upper trap increasing pain free motion. Pt was educated about HEP and posture awareness.  Progress per Plan of Care           Manual Therapy:    20     mins  74367;  Therapeutic Exercise:    10     mins  77229;     Neuromuscular Randi:        mins  82612;    Therapeutic Activity:          mins  79643;     Gait Training:           mins  06586;     Ultrasound:          mins  33770;    Electrical Stimulation:         mins  45554 ( );  Dry Needling          mins self-pay    Timed Treatment:   30   mins   Total Treatment:     30   mins    Guerda Bhat, PT  Physical Therapist                   No assistance needed

## 2023-08-03 DIAGNOSIS — E78.2 MIXED HYPERLIPIDEMIA: ICD-10-CM

## 2023-08-03 DIAGNOSIS — R73.03 PREDIABETES: ICD-10-CM

## 2023-08-03 RX ORDER — SIMVASTATIN 10 MG
TABLET ORAL
Qty: 90 TABLET | Refills: 1 | Status: SHIPPED | OUTPATIENT
Start: 2023-08-03

## 2024-03-14 ENCOUNTER — LAB (OUTPATIENT)
Dept: LAB | Facility: HOSPITAL | Age: 79
End: 2024-03-14
Payer: MEDICARE

## 2024-03-14 ENCOUNTER — OFFICE VISIT (OUTPATIENT)
Dept: SPORTS MEDICINE | Facility: CLINIC | Age: 79
End: 2024-03-14
Payer: MEDICARE

## 2024-03-14 VITALS
HEART RATE: 60 BPM | OXYGEN SATURATION: 96 % | DIASTOLIC BLOOD PRESSURE: 88 MMHG | BODY MASS INDEX: 26.67 KG/M2 | TEMPERATURE: 97.6 F | SYSTOLIC BLOOD PRESSURE: 130 MMHG | WEIGHT: 176 LBS | HEIGHT: 68 IN

## 2024-03-14 DIAGNOSIS — R39.14 BENIGN PROSTATIC HYPERPLASIA WITH INCOMPLETE BLADDER EMPTYING: ICD-10-CM

## 2024-03-14 DIAGNOSIS — Z00.00 MEDICARE ANNUAL WELLNESS VISIT, SUBSEQUENT: Primary | ICD-10-CM

## 2024-03-14 DIAGNOSIS — N40.1 BENIGN PROSTATIC HYPERPLASIA WITH INCOMPLETE BLADDER EMPTYING: ICD-10-CM

## 2024-03-14 DIAGNOSIS — E78.2 MIXED HYPERLIPIDEMIA: ICD-10-CM

## 2024-03-14 DIAGNOSIS — Z13.6 SCREENING FOR CARDIOVASCULAR CONDITION: ICD-10-CM

## 2024-03-14 DIAGNOSIS — R73.03 PREDIABETES: ICD-10-CM

## 2024-03-14 LAB
ALBUMIN SERPL-MCNC: 4.6 G/DL (ref 3.5–5.2)
ALBUMIN/GLOB SERPL: 1.5 G/DL
ALP SERPL-CCNC: 84 U/L (ref 39–117)
ALT SERPL W P-5'-P-CCNC: 24 U/L (ref 1–41)
ANION GAP SERPL CALCULATED.3IONS-SCNC: 8.8 MMOL/L (ref 5–15)
AST SERPL-CCNC: 27 U/L (ref 1–40)
BASOPHILS # BLD AUTO: 0.11 10*3/MM3 (ref 0–0.2)
BASOPHILS NFR BLD AUTO: 1.4 % (ref 0–1.5)
BILIRUB SERPL-MCNC: 1.5 MG/DL (ref 0–1.2)
BILIRUB UR QL STRIP: NEGATIVE
BUN SERPL-MCNC: 10 MG/DL (ref 8–23)
BUN/CREAT SERPL: 9.5 (ref 7–25)
CALCIUM SPEC-SCNC: 10 MG/DL (ref 8.6–10.5)
CHLORIDE SERPL-SCNC: 102 MMOL/L (ref 98–107)
CHOLEST SERPL-MCNC: 169 MG/DL (ref 0–200)
CLARITY UR: CLEAR
CO2 SERPL-SCNC: 29.2 MMOL/L (ref 22–29)
COLOR UR: YELLOW
CREAT SERPL-MCNC: 1.05 MG/DL (ref 0.76–1.27)
DEPRECATED RDW RBC AUTO: 41.1 FL (ref 37–54)
EGFRCR SERPLBLD CKD-EPI 2021: 73.1 ML/MIN/1.73
EOSINOPHIL # BLD AUTO: 0.35 10*3/MM3 (ref 0–0.4)
EOSINOPHIL NFR BLD AUTO: 4.4 % (ref 0.3–6.2)
ERYTHROCYTE [DISTWIDTH] IN BLOOD BY AUTOMATED COUNT: 11.9 % (ref 12.3–15.4)
GLOBULIN UR ELPH-MCNC: 3.1 GM/DL
GLUCOSE SERPL-MCNC: 91 MG/DL (ref 65–99)
GLUCOSE UR STRIP-MCNC: NEGATIVE MG/DL
HBA1C MFR BLD: 6.2 % (ref 4.8–5.6)
HCT VFR BLD AUTO: 44.5 % (ref 37.5–51)
HDLC SERPL QL: 3.38
HDLC SERPL-MCNC: 50 MG/DL (ref 40–60)
HGB BLD-MCNC: 14.7 G/DL (ref 13–17.7)
HGB UR QL STRIP.AUTO: NEGATIVE
HOLD SPECIMEN: NORMAL
IMM GRANULOCYTES # BLD AUTO: 0.02 10*3/MM3 (ref 0–0.05)
IMM GRANULOCYTES NFR BLD AUTO: 0.3 % (ref 0–0.5)
KETONES UR QL STRIP: NEGATIVE
LDLC SERPL CALC-MCNC: 92 MG/DL (ref 0–100)
LEUKOCYTE ESTERASE UR QL STRIP.AUTO: NEGATIVE
LYMPHOCYTES # BLD AUTO: 2.43 10*3/MM3 (ref 0.7–3.1)
LYMPHOCYTES NFR BLD AUTO: 30.6 % (ref 19.6–45.3)
MCH RBC QN AUTO: 31.2 PG (ref 26.6–33)
MCHC RBC AUTO-ENTMCNC: 33 G/DL (ref 31.5–35.7)
MCV RBC AUTO: 94.5 FL (ref 79–97)
MONOCYTES # BLD AUTO: 0.5 10*3/MM3 (ref 0.1–0.9)
MONOCYTES NFR BLD AUTO: 6.3 % (ref 5–12)
NEUTROPHILS NFR BLD AUTO: 4.52 10*3/MM3 (ref 1.7–7)
NEUTROPHILS NFR BLD AUTO: 57 % (ref 42.7–76)
NITRITE UR QL STRIP: NEGATIVE
NRBC BLD AUTO-RTO: 0 /100 WBC (ref 0–0.2)
PH UR STRIP.AUTO: 8 [PH] (ref 5–8)
PLATELET # BLD AUTO: 216 10*3/MM3 (ref 140–450)
PMV BLD AUTO: 10.3 FL (ref 6–12)
POTASSIUM SERPL-SCNC: 4.4 MMOL/L (ref 3.5–5.2)
PROT SERPL-MCNC: 7.7 G/DL (ref 6–8.5)
PROT UR QL STRIP: NEGATIVE
PSA SERPL-MCNC: 2.36 NG/ML (ref 0–4)
RBC # BLD AUTO: 4.71 10*6/MM3 (ref 4.14–5.8)
SODIUM SERPL-SCNC: 140 MMOL/L (ref 136–145)
SP GR UR STRIP: 1.02 (ref 1–1.03)
TRIGL SERPL-MCNC: 158 MG/DL (ref 0–150)
UROBILINOGEN UR QL STRIP: NORMAL
VLDLC SERPL-MCNC: 27 MG/DL (ref 5–40)
WBC NRBC COR # BLD AUTO: 7.93 10*3/MM3 (ref 3.4–10.8)

## 2024-03-14 PROCEDURE — G0439 PPPS, SUBSEQ VISIT: HCPCS | Performed by: FAMILY MEDICINE

## 2024-03-14 PROCEDURE — 80053 COMPREHEN METABOLIC PANEL: CPT | Performed by: FAMILY MEDICINE

## 2024-03-14 PROCEDURE — 84443 ASSAY THYROID STIM HORMONE: CPT | Performed by: FAMILY MEDICINE

## 2024-03-14 PROCEDURE — 81003 URINALYSIS AUTO W/O SCOPE: CPT | Performed by: FAMILY MEDICINE

## 2024-03-14 PROCEDURE — 36415 COLL VENOUS BLD VENIPUNCTURE: CPT | Performed by: FAMILY MEDICINE

## 2024-03-14 PROCEDURE — 84153 ASSAY OF PSA TOTAL: CPT | Performed by: FAMILY MEDICINE

## 2024-03-14 PROCEDURE — 93000 ELECTROCARDIOGRAM COMPLETE: CPT | Performed by: FAMILY MEDICINE

## 2024-03-14 PROCEDURE — 83036 HEMOGLOBIN GLYCOSYLATED A1C: CPT | Performed by: FAMILY MEDICINE

## 2024-03-14 PROCEDURE — 80061 LIPID PANEL: CPT | Performed by: FAMILY MEDICINE

## 2024-03-14 PROCEDURE — 85025 COMPLETE CBC W/AUTO DIFF WBC: CPT | Performed by: FAMILY MEDICINE

## 2024-03-14 RX ORDER — SIMVASTATIN 10 MG
10 TABLET ORAL NIGHTLY
Qty: 90 TABLET | Refills: 3 | Status: SHIPPED | OUTPATIENT
Start: 2024-03-14

## 2024-03-14 NOTE — PROGRESS NOTES
QUICK REFERENCE INFORMATION:  The ABCs of the Annual Wellness Visit    Subsequent Medicare Wellness Visit    HEALTH RISK ASSESSMENT    1945    Recent Hospitalizations:  No hospitalization(s) within the last year..        Current Medical Providers:  Patient Care Team:  David Franks MD as PCP - General (Sports Medicine)        Smoking Status:  Social History     Tobacco Use   Smoking Status Former    Current packs/day: 0.00    Average packs/day: 1 pack/day for 40.0 years (40.0 ttl pk-yrs)    Types: Cigarettes    Start date: 1962    Quit date: 2002    Years since quittin.2   Smokeless Tobacco Never       Alcohol Consumption:  Social History     Substance and Sexual Activity   Alcohol Use Yes    Alcohol/week: 7.0 standard drinks of alcohol    Types: 7 Cans of beer per week       Depression Screen:       3/14/2024     1:10 PM   PHQ-2/PHQ-9 Depression Screening   Little Interest or Pleasure in Doing Things 0-->not at all   Feeling Down, Depressed or Hopeless 0-->not at all   PHQ-9: Brief Depression Severity Measure Score 0       Health Habits and Functional and Cognitive Screening:      3/7/2024     9:23 AM   Functional & Cognitive Status   Do you have difficulty preparing food and eating? No   Do you have difficulty bathing yourself, getting dressed or grooming yourself? No   Do you have difficulty using the toilet? No   Do you have difficulty moving around from place to place? No   Do you have trouble with steps or getting out of a bed or a chair? No   Current Diet Well Balanced Diet   Dental Exam Up to date   Eye Exam Up to date   Exercise (times per week) 3 times per week   Current Exercises Include Gardening;Home Fitness Gym;House Cleaning;Treadmill   Do you need help using the phone?  No   Are you deaf or do you have serious difficulty hearing?  Yes   Do you need help to go to places out of walking distance? No   Do you need help shopping? No   Do you need help preparing meals?  No   Do you  need help with housework?  No   Do you need help with laundry? No   Do you need help taking your medications? No   Do you need help managing money? No   Do you ever drive or ride in a car without wearing a seat belt? No   Have you felt unusual stress, anger or loneliness in the last month? No   Who do you live with? Alone   If you need help, do you have trouble finding someone available to you? No   Have you been bothered in the last four weeks by sexual problems? No   Do you have difficulty concentrating, remembering or making decisions? No           Does the patient have evidence of cognitive impairment? No    Aspirin use counseling: Does not need ASA (and currently is not on it)      Recent Lab Results:  CMP:  Lab Results   Component Value Date    BUN 10 03/14/2024    CREATININE 1.05 03/14/2024    EGFRIFNONA 64 02/17/2021    EGFRIFAFRI 78 02/17/2021    BCR 9.5 03/14/2024     03/14/2024    K 4.4 03/14/2024    CO2 29.2 (H) 03/14/2024    CALCIUM 10.0 03/14/2024    PROTENTOTREF 7.2 04/11/2022    ALBUMIN 4.6 03/14/2024    LABGLOBREF 2.7 04/11/2022    LABIL2 1.7 04/11/2022    BILITOT 1.5 (H) 03/14/2024    ALKPHOS 84 03/14/2024    AST 27 03/14/2024    ALT 24 03/14/2024     Lipid Panel:  Lab Results   Component Value Date    TRIG 162 (H) 04/11/2022    HDL 40 04/11/2022    VLDL 28 04/11/2022    LDLHDL 2.4 04/11/2022     HbA1c:  Lab Results   Component Value Date    HGBA1C 6.1 (H) 04/11/2022       Visual Acuity:  No results found.    Age-appropriate Screening Schedule:  Refer to the list below for future screening recommendations based on patient's age, sex and/or medical conditions. Orders for these recommended tests are listed in the plan section. The patient has been provided with a written plan.    Health Maintenance   Topic Date Due    RSV Vaccine - Adults (1 - 1-dose 60+ series) Never done    LIPID PANEL  04/11/2023    ANNUAL WELLNESS VISIT  04/18/2023    BMI FOLLOWUP  04/18/2023    COVID-19 Vaccine (4 -  2023-24 season) 09/01/2023    COLORECTAL CANCER SCREENING  01/27/2026    TDAP/TD VACCINES (3 - Td or Tdap) 02/02/2032    HEPATITIS C SCREENING  Completed    INFLUENZA VACCINE  Completed    Pneumococcal Vaccine 65+  Completed    ZOSTER VACCINE  Addressed        Subjective   History of Present Illness    Chepe Arias is a 78 y.o. male who presents for an Subsequent Wellness Visit.    The following portions of the patient's history were reviewed and updated as appropriate: allergies, current medications, past family history, past medical history, past social history, past surgical history, and problem list.    Outpatient Medications Prior to Visit   Medication Sig Dispense Refill    ALPRAZolam (XANAX) 0.25 MG tablet       Cholecalciferol (VITAMIN D) 2000 UNITS capsule Take  by mouth.      FLUoxetine (PROzac) 20 MG capsule       Magnesium 400 MG capsule Take  by mouth.      Multiple Minerals-Vitamins (CALCIUM CITRATE +) tablet Take  by mouth.      MULTIPLE VITAMIN PO Take  by mouth.      Omega-3 Fatty Acids (FISH OIL) 1000 MG capsule capsule Take  by mouth.      simvastatin (ZOCOR) 10 MG tablet TAKE 1 TABLET EVERY NIGHT 90 tablet 1    famotidine (PEPCID) 20 MG tablet Take  by mouth.       No facility-administered medications prior to visit.       Patient Active Problem List   Diagnosis    Hyperlipidemia    Low back pain    Prediabetes    Anxiety disorder       Advance Care Planning:  ACP discussion was held with the patient during this visit. Patient has an advance directive in EMR which is still valid.     Identification of Risk Factors:  Risk factors include: Cardiovascular risk  Hearing Problem  Inadequate Social Support, Isolation, Loneliness, Lack of Transportation, Financial Difficulties, or Caregiver Stress .    Review of Systems    Compared to one year ago, the patient feels his physical health is worse - feels more fatigued with age.  Compared to one year ago, the patient feels his mental health is  "better.    Objective     Physical Exam    Vitals:    03/14/24 1304   BP: 130/88   BP Location: Right arm   Patient Position: Sitting   Cuff Size: Adult   Pulse: 60   Temp: 97.6 °F (36.4 °C)   SpO2: 96%   Weight: 79.8 kg (176 lb)   Height: 172.7 cm (67.99\")   PainSc:   2   PainLoc: Back       BMI is >= 25 and <30. (Overweight) The following options were offered after discussion;: exercise counseling/recommendations and nutrition counseling/recommendations      Assessment & Plan   Patient Self-Management and Personalized Health Advice  The patient has been provided with information about: diet, exercise, weight management, and prevention of cardiac or vascular disease and preventive services including:   Annual Wellness Visit (AWV)  Depression Screening (15 minutes face to face, Code )  Diabetes Screening-Lab Order for either glucose quantitative blood (except reagent strip), glucose;post glucose dose(includes glucose), or glucose tolerance test-3 specimens(includes glucose)  Prostate Cancer Screening .    Visit Diagnoses:    ICD-10-CM ICD-9-CM   1. Medicare annual wellness visit, subsequent  Z00.00 V70.0   2. Prediabetes  R73.03 790.29   3. Mixed hyperlipidemia  E78.2 272.2   4. Screening for cardiovascular condition  Z13.6 V81.2   5. Benign prostatic hyperplasia with incomplete bladder emptying  N40.1 600.01    R39.14 788.21       Orders Placed This Encounter   Procedures    Hemoglobin A1c     Order Specific Question:   Release to patient     Answer:   Routine Release [[id number]]    Comprehensive Metabolic Panel     Order Specific Question:   Release to patient     Answer:   Routine Release [[id number]]    Lipid Panel With / Chol / HDL Ratio     Order Specific Question:   Release to patient     Answer:   Routine Release [[id number]]    Thyroid Cascade Profile     Order Specific Question:   Release to patient     Answer:   Routine Release [[id number]]    Urinalysis With Culture If Indicated - Urine, Clean Catch "     Order Specific Question:   Release to patient     Answer:   Routine Release [[id number]]    PSA DIAGNOSTIC     Order Specific Question:   Release to patient     Answer:   Routine Release [[id number]]    CBC Auto Differential     Order Specific Question:   Release to patient     Answer:   Routine Release [[id number]]    [place] Urine Culture Tube - Urine, Clean Catch     Order Specific Question:   Release to patient     Answer:   Routine Release [[id number]]    ECG 12 Lead     Order Specific Question:   Reason for Exam:     Answer:   screening     Order Specific Question:   Release to patient     Answer:   Routine Release [[id number]]    CBC & Differential     Order Specific Question:   Manual Differential     Answer:   No     Order Specific Question:   Release to patient     Answer:   Routine Release [[id number]]       Outpatient Encounter Medications as of 3/14/2024   Medication Sig Dispense Refill    ALPRAZolam (XANAX) 0.25 MG tablet       Cholecalciferol (VITAMIN D) 2000 UNITS capsule Take  by mouth.      FLUoxetine (PROzac) 20 MG capsule       Magnesium 400 MG capsule Take  by mouth.      Multiple Minerals-Vitamins (CALCIUM CITRATE +) tablet Take  by mouth.      MULTIPLE VITAMIN PO Take  by mouth.      Omega-3 Fatty Acids (FISH OIL) 1000 MG capsule capsule Take  by mouth.      simvastatin (ZOCOR) 10 MG tablet Take 1 tablet by mouth Every Night. 90 tablet 3    [DISCONTINUED] simvastatin (ZOCOR) 10 MG tablet TAKE 1 TABLET EVERY NIGHT 90 tablet 1    [DISCONTINUED] famotidine (PEPCID) 20 MG tablet Take  by mouth.       No facility-administered encounter medications on file as of 3/14/2024.       Reviewed use of high risk medication in the elderly: yes  Reviewed for potential of harmful drug interactions in the elderly: yes      ECG 12 Lead    Date/Time: 3/14/2024 2:14 PM  Performed by: David Franks MD    Authorized by: David Franks MD  Previous ECG: no previous ECG available  Rhythm: sinus rhythm  Rate:  normal  Conduction: conduction normal  QRS axis: normal    Clinical impression: normal ECG             Follow Up:  No follow-ups on file.     An After Visit Summary and PPPS with all of these plans were given to the patient.         Overall well.  Continues current management plan and is stable.  He asked about an EKG due to his heart disease risk factors and does not recall if he has ever had 1.  This was performed today for screening due to his prediabetes, hyperlipidemia, and family history.  Thankfully reassuring.  Continue current medications.  Continue follow-up with psychiatry.  Discussed considerations for RSV vaccination.

## 2024-03-15 LAB — TSH SERPL DL<=0.005 MIU/L-ACNC: 1.28 UIU/ML (ref 0.45–4.5)

## 2025-02-21 DIAGNOSIS — E78.2 MIXED HYPERLIPIDEMIA: ICD-10-CM

## 2025-02-21 DIAGNOSIS — R73.03 PREDIABETES: ICD-10-CM

## 2025-02-21 RX ORDER — SIMVASTATIN 10 MG
10 TABLET ORAL NIGHTLY
Qty: 90 TABLET | Refills: 3 | Status: SHIPPED | OUTPATIENT
Start: 2025-02-21

## 2025-04-30 ENCOUNTER — OFFICE VISIT (OUTPATIENT)
Dept: SPORTS MEDICINE | Facility: CLINIC | Age: 80
End: 2025-04-30
Payer: MEDICARE

## 2025-04-30 ENCOUNTER — LAB (OUTPATIENT)
Dept: LAB | Facility: HOSPITAL | Age: 80
End: 2025-04-30
Payer: MEDICARE

## 2025-04-30 VITALS
OXYGEN SATURATION: 93 % | DIASTOLIC BLOOD PRESSURE: 80 MMHG | TEMPERATURE: 97.6 F | HEIGHT: 68 IN | SYSTOLIC BLOOD PRESSURE: 122 MMHG | BODY MASS INDEX: 26.98 KG/M2 | HEART RATE: 68 BPM | WEIGHT: 178 LBS

## 2025-04-30 DIAGNOSIS — N40.1 BENIGN PROSTATIC HYPERPLASIA WITH INCOMPLETE BLADDER EMPTYING: ICD-10-CM

## 2025-04-30 DIAGNOSIS — R73.03 PREDIABETES: ICD-10-CM

## 2025-04-30 DIAGNOSIS — E78.2 MIXED HYPERLIPIDEMIA: ICD-10-CM

## 2025-04-30 DIAGNOSIS — R39.14 BENIGN PROSTATIC HYPERPLASIA WITH INCOMPLETE BLADDER EMPTYING: ICD-10-CM

## 2025-04-30 DIAGNOSIS — Z00.00 MEDICARE ANNUAL WELLNESS VISIT, SUBSEQUENT: Primary | ICD-10-CM

## 2025-04-30 PROBLEM — Z96.21 COCHLEAR IMPLANT IN PLACE: Status: ACTIVE | Noted: 2025-04-30

## 2025-04-30 LAB
ALBUMIN SERPL-MCNC: 4.7 G/DL (ref 3.5–5.2)
ALBUMIN/GLOB SERPL: 1.5 G/DL
ALP SERPL-CCNC: 79 U/L (ref 39–117)
ALT SERPL W P-5'-P-CCNC: 19 U/L (ref 1–41)
ANION GAP SERPL CALCULATED.3IONS-SCNC: 7.2 MMOL/L (ref 5–15)
AST SERPL-CCNC: 24 U/L (ref 1–40)
BASOPHILS # BLD AUTO: 0.1 10*3/MM3 (ref 0–0.2)
BASOPHILS NFR BLD AUTO: 1.5 % (ref 0–1.5)
BILIRUB SERPL-MCNC: 1.3 MG/DL (ref 0–1.2)
BUN SERPL-MCNC: 10 MG/DL (ref 8–23)
BUN/CREAT SERPL: 9.3 (ref 7–25)
CALCIUM SPEC-SCNC: 10 MG/DL (ref 8.6–10.5)
CHLORIDE SERPL-SCNC: 102 MMOL/L (ref 98–107)
CHOLEST SERPL-MCNC: 162 MG/DL (ref 0–200)
CO2 SERPL-SCNC: 30.8 MMOL/L (ref 22–29)
CREAT SERPL-MCNC: 1.08 MG/DL (ref 0.76–1.27)
DEPRECATED RDW RBC AUTO: 41.2 FL (ref 37–54)
EGFRCR SERPLBLD CKD-EPI 2021: 70.2 ML/MIN/1.73
EOSINOPHIL # BLD AUTO: 0.31 10*3/MM3 (ref 0–0.4)
EOSINOPHIL NFR BLD AUTO: 4.6 % (ref 0.3–6.2)
ERYTHROCYTE [DISTWIDTH] IN BLOOD BY AUTOMATED COUNT: 11.9 % (ref 12.3–15.4)
GLOBULIN UR ELPH-MCNC: 3.2 GM/DL
GLUCOSE SERPL-MCNC: 141 MG/DL (ref 65–99)
HBA1C MFR BLD: 6.4 % (ref 4.8–5.6)
HCT VFR BLD AUTO: 45.2 % (ref 37.5–51)
HDLC SERPL QL: 3.77
HDLC SERPL-MCNC: 43 MG/DL (ref 40–60)
HGB BLD-MCNC: 15.2 G/DL (ref 13–17.7)
IMM GRANULOCYTES # BLD AUTO: 0.02 10*3/MM3 (ref 0–0.05)
IMM GRANULOCYTES NFR BLD AUTO: 0.3 % (ref 0–0.5)
LDLC SERPL CALC-MCNC: 94 MG/DL (ref 0–100)
LYMPHOCYTES # BLD AUTO: 1.55 10*3/MM3 (ref 0.7–3.1)
LYMPHOCYTES NFR BLD AUTO: 22.8 % (ref 19.6–45.3)
MCH RBC QN AUTO: 31.7 PG (ref 26.6–33)
MCHC RBC AUTO-ENTMCNC: 33.6 G/DL (ref 31.5–35.7)
MCV RBC AUTO: 94.2 FL (ref 79–97)
MONOCYTES # BLD AUTO: 0.39 10*3/MM3 (ref 0.1–0.9)
MONOCYTES NFR BLD AUTO: 5.7 % (ref 5–12)
NEUTROPHILS NFR BLD AUTO: 4.44 10*3/MM3 (ref 1.7–7)
NEUTROPHILS NFR BLD AUTO: 65.1 % (ref 42.7–76)
NRBC BLD AUTO-RTO: 0 /100 WBC (ref 0–0.2)
PLATELET # BLD AUTO: 217 10*3/MM3 (ref 140–450)
PMV BLD AUTO: 10.1 FL (ref 6–12)
POTASSIUM SERPL-SCNC: 4.8 MMOL/L (ref 3.5–5.2)
PROT SERPL-MCNC: 7.9 G/DL (ref 6–8.5)
PSA SERPL-MCNC: 2.76 NG/ML (ref 0–4)
RBC # BLD AUTO: 4.8 10*6/MM3 (ref 4.14–5.8)
SODIUM SERPL-SCNC: 140 MMOL/L (ref 136–145)
TRIGL SERPL-MCNC: 139 MG/DL (ref 0–150)
VLDLC SERPL-MCNC: 25 MG/DL (ref 5–40)
WBC NRBC COR # BLD AUTO: 6.81 10*3/MM3 (ref 3.4–10.8)

## 2025-04-30 PROCEDURE — 85025 COMPLETE CBC W/AUTO DIFF WBC: CPT | Performed by: FAMILY MEDICINE

## 2025-04-30 PROCEDURE — 84153 ASSAY OF PSA TOTAL: CPT | Performed by: FAMILY MEDICINE

## 2025-04-30 PROCEDURE — 83036 HEMOGLOBIN GLYCOSYLATED A1C: CPT | Performed by: FAMILY MEDICINE

## 2025-04-30 PROCEDURE — 80053 COMPREHEN METABOLIC PANEL: CPT | Performed by: FAMILY MEDICINE

## 2025-04-30 PROCEDURE — 36415 COLL VENOUS BLD VENIPUNCTURE: CPT | Performed by: FAMILY MEDICINE

## 2025-04-30 PROCEDURE — 80061 LIPID PANEL: CPT | Performed by: FAMILY MEDICINE

## 2025-04-30 PROCEDURE — 84443 ASSAY THYROID STIM HORMONE: CPT | Performed by: FAMILY MEDICINE

## 2025-04-30 NOTE — ASSESSMENT & PLAN NOTE
Orders:    CBC & Differential    Comprehensive Metabolic Panel    Lipid Panel With / Chol / HDL Ratio    Thyroid Cascade Profile    Urinalysis With Culture If Indicated -    Hemoglobin A1c    PSA DIAGNOSTIC

## 2025-04-30 NOTE — PROGRESS NOTES
Subjective   The ABCs of the Annual Wellness Visit  Medicare Wellness Visit      Chepe Arias is a 79 y.o. patient who presents for a Medicare Wellness Visit.    The following portions of the patient's history were reviewed and   updated as appropriate: allergies, current medications, past family history, past medical history, past social history, past surgical history, and problem list.    Compared to one year ago, the patient's physical   health is the same.  Compared to one year ago, the patient's mental   health is better.    Recent Hospitalizations:  He was not admitted to the hospital during the last year.     Current Medical Providers:  Patient Care Team:  David Franks MD as PCP - General (Sports Medicine)    Outpatient Medications Prior to Visit   Medication Sig Dispense Refill    Cholecalciferol (VITAMIN D) 2000 UNITS capsule Take  by mouth.      FLUoxetine (PROzac) 20 MG capsule       Misc Natural Products (BEET ROOT PO)       Multiple Minerals-Vitamins (CALCIUM CITRATE +) tablet Take  by mouth.      MULTIPLE VITAMIN PO Take  by mouth.      Omega-3 Fatty Acids (FISH OIL) 1000 MG capsule capsule Take  by mouth.      simvastatin (ZOCOR) 10 MG tablet TAKE 1 TABLET EVERY NIGHT 90 tablet 3    ALPRAZolam (XANAX) 0.25 MG tablet  (Patient not taking: Reported on 4/30/2025)      Magnesium 400 MG capsule Take  by mouth. (Patient not taking: Reported on 4/30/2025)       No facility-administered medications prior to visit.     No opioid medication identified on active medication list. I have reviewed chart for other potential  high risk medication/s and harmful drug interactions in the elderly.      Aspirin is not on active medication list.  Aspirin use is not indicated based on review of current medical condition/s. Risk of harm outweighs potential benefits.  .    Patient Active Problem List   Diagnosis    Hyperlipidemia    Low back pain    Prediabetes    Anxiety disorder    Cochlear implant in place  "    Advance Care Planning Advance Directive is on file.  ACP discussion was held with the patient during this visit. Patient has an advance directive in EMR which is still valid.             Objective   Vitals:    25 0859   BP: 122/80   BP Location: Right arm   Patient Position: Sitting   Cuff Size: Adult   Pulse: 68   Temp: 97.6 °F (36.4 °C)   TempSrc: Temporal   SpO2: 93%   Weight: 80.7 kg (178 lb)   Height: 172.7 cm (67.99\")   PainSc: 1    PainLoc: Back       Estimated body mass index is 27.07 kg/m² as calculated from the following:    Height as of this encounter: 172.7 cm (67.99\").    Weight as of this encounter: 80.7 kg (178 lb).    BMI is >= 25 and <30. (Overweight) The following options were offered after discussion;: exercise counseling/recommendations and nutrition counseling/recommendations           Does the patient have evidence of cognitive impairment? No                                                                                                Health  Risk Assessment    Smoking Status:  Social History     Tobacco Use   Smoking Status Former    Current packs/day: 0.00    Average packs/day: 1 pack/day for 40.0 years (40.0 ttl pk-yrs)    Types: Cigarettes    Start date: 1962    Quit date: 2002    Years since quittin.3   Smokeless Tobacco Never     Alcohol Consumption:  Social History     Substance and Sexual Activity   Alcohol Use Yes    Alcohol/week: 7.0 standard drinks of alcohol    Types: 7 Cans of beer per week       Fall Risk Screen  STEADI Fall Risk Assessment was completed, and patient is at LOW risk for falls.Assessment completed on:2025    Depression Screening   Little interest or pleasure in doing things? Not at all   Feeling down, depressed, or hopeless? Not at all   PHQ-2 Total Score 0      Health Habits and Functional and Cognitive Screenin/25/2025     9:56 AM   Functional & Cognitive Status   Do you have difficulty preparing food and eating? No   Do you " have difficulty bathing yourself, getting dressed or grooming yourself? No   Do you have difficulty using the toilet? No   Do you have difficulty moving around from place to place? No   Do you have trouble with steps or getting out of a bed or a chair? No   Current Diet Well Balanced Diet   Dental Exam Up to date   Eye Exam Up to date   Exercise (times per week) 2 times per week   Current Exercises Include Gardening;House Cleaning;Yard Work   Do you need help using the phone?  No   Are you deaf or do you have serious difficulty hearing?  Yes   Do you need help to go to places out of walking distance? No   Do you need help shopping? No   Do you need help preparing meals?  No   Do you need help with housework?  No   Do you need help with laundry? No   Do you need help taking your medications? No   Do you need help managing money? No   Do you ever drive or ride in a car without wearing a seat belt? No   Have you felt unusual stress, anger or loneliness in the last month? No   Who do you live with? Alone   If you need help, do you have trouble finding someone available to you? No   Have you been bothered in the last four weeks by sexual problems? No   Do you have difficulty concentrating, remembering or making decisions? No           Age-appropriate Screening Schedule:  Refer to the list below for future screening recommendations based on patient's age, sex and/or medical conditions. Orders for these recommended tests are listed in the plan section. The patient has been provided with a written plan.    Health Maintenance List  Health Maintenance   Topic Date Due    RSV Vaccine - Adults (1 - 1-dose 75+ series) Never done    LIPID PANEL  03/14/2025    COVID-19 Vaccine (5 - 2024-25 season) 06/18/2025    INFLUENZA VACCINE  07/01/2025    COLORECTAL CANCER SCREENING  01/27/2026    ANNUAL WELLNESS VISIT  04/30/2026    TDAP/TD VACCINES (3 - Td or Tdap) 02/02/2032    HEPATITIS C SCREENING  Completed    Pneumococcal Vaccine 50+   "Completed    ZOSTER VACCINE  Addressed                                                                                                                                                CMS Preventative Services Quick Reference  Risk Factors Identified During Encounter  Hearing Problem:  continue f/u with ENT    The above risks/problems have been discussed with the patient.  Pertinent information has been shared with the patient in the After Visit Summary.  An After Visit Summary and PPPS were made available to the patient.    Follow Up:   Next Medicare Wellness visit to be scheduled in 1 year.         Additional E&M Note during same encounter follows:  Patient has additional, significant, and separately identifiable condition(s)/problem(s) that require work above and beyond the Medicare Wellness Visit     Chief Complaint  Medicare Wellness-subsequent (AWV- fasting)    Subjective   HPI  Chepe is also being seen today for additional medical problem/s.        F/u HLD, pre-DM, BPH        Objective   Vital Signs:  /80 (BP Location: Right arm, Patient Position: Sitting, Cuff Size: Adult)   Pulse 68   Temp 97.6 °F (36.4 °C) (Temporal)   Ht 172.7 cm (67.99\")   Wt 80.7 kg (178 lb)   SpO2 93%   BMI 27.07 kg/m²   Physical Exam  Vitals and nursing note reviewed.   Constitutional:       General: He is not in acute distress.     Appearance: He is well-developed.   HENT:      Head: Normocephalic and atraumatic.   Eyes:      Conjunctiva/sclera: Conjunctivae normal.      Pupils: Pupils are equal, round, and reactive to light.   Cardiovascular:      Rate and Rhythm: Normal rate and regular rhythm.      Heart sounds: Normal heart sounds.   Pulmonary:      Effort: Pulmonary effort is normal.      Breath sounds: Normal breath sounds.   Musculoskeletal:      Cervical back: Normal range of motion.   Skin:     General: Skin is warm and dry.      Comments: Sebaceous cyst behind L ear about 1 cm    Erythema occiput without flaking "   Neurological:      General: No focal deficit present.      Mental Status: He is alert and oriented to person, place, and time.   Psychiatric:         Mood and Affect: Mood normal.         Behavior: Behavior normal.         Thought Content: Thought content normal.         Judgment: Judgment normal.                    Assessment and Plan      Medicare annual wellness visit, subsequent    Orders:    CBC & Differential    Comprehensive Metabolic Panel    Lipid Panel With / Chol / HDL Ratio    Thyroid Cascade Profile    Urinalysis With Culture If Indicated -    Hemoglobin A1c    PSA DIAGNOSTIC    Prediabetes    Orders:    CBC & Differential    Comprehensive Metabolic Panel    Lipid Panel With / Chol / HDL Ratio    Thyroid Cascade Profile    Urinalysis With Culture If Indicated -    Hemoglobin A1c    PSA DIAGNOSTIC    Mixed hyperlipidemia       Orders:    CBC & Differential    Comprehensive Metabolic Panel    Lipid Panel With / Chol / HDL Ratio    Thyroid Cascade Profile    Urinalysis With Culture If Indicated -    Hemoglobin A1c    PSA DIAGNOSTIC    Benign prostatic hyperplasia with incomplete bladder emptying    Orders:    CBC & Differential    Comprehensive Metabolic Panel    Lipid Panel With / Chol / HDL Ratio    Thyroid Cascade Profile    Urinalysis With Culture If Indicated -    Hemoglobin A1c    PSA DIAGNOSTIC            Follow Up   No follow-ups on file.  Patient was given instructions and counseling regarding his condition or for health maintenance advice. Please see specific information pulled into the AVS if appropriate.      -Overall well  -working outside gardening  -anxiety controlled - hasn't needed xanax much at all, weaned off on purpose. Still on prozac  -eating overall healthy diet  -chronic hearing loss stable with R side hearing aid and L side cochlear implant will continue ENT f/u  -sebaceous cyst L postauricular inferior to device ok to monitor   -seborrhea occiput try shampoo otc

## 2025-05-02 LAB — TSH SERPL DL<=0.005 MIU/L-ACNC: 1.68 UIU/ML (ref 0.45–4.5)
